# Patient Record
Sex: MALE | Race: WHITE
[De-identification: names, ages, dates, MRNs, and addresses within clinical notes are randomized per-mention and may not be internally consistent; named-entity substitution may affect disease eponyms.]

---

## 2017-07-07 NOTE — ECHO
DATE OF PROCEDURE: 07/07/2017

 

AGE: 47

GENDER: Male

HEIGHT: 72 inches.

WEIGHT: 155 pounds.

BODY SURFACE AREA: 1.91 sq m.

 

OUTPATIENT:

 

REFERRING PHYSICIAN: Won Mcgee

 

INDICATION: Disorder of circulatory system.

 

MEASUREMENTS:

 

2D MEASUREMENTS:

RV - 4.2 cm

LV- 4.8 cm

Septum - 1.0 cm

Posterior wall - 1.0 cm

Aortic root - 3.9 cm

LA - 4.2 cm

LVEF - 65%

 

DOPPLER MEASUREMENTS:

AV - 1.0 m/s

LVOT - 0.8 m/s

LVOT diameter - 2.4 cm

MV-E: 53  A: 47  EA ratio 1.1

Early mitral deceleration time 225 ms

E-prime - 7

A-prime - 12

E/E prime ratio 7.6

PV - 0.8 m/s

Pulmonary artery acceleration time 116 ms

RVSP- 32 mmHg

IVC - 1.6 cm

 

COMMENTS: Normal sinus rhythm without intraventricular conduction disturbance.

 

Borderline left atrial enlargement but normal left ventricular size.  Borderline

right heart chamber enlargement.  Left ventricle (LV) wall thickness was normal.

On real-time imaging from the parasternal and apical projections wall motion was

symmetrical and normal to hyperkinetic. Normal-appearing mitral valvular

apparatus and leaflet excursion with no posterior systolic buckling.  Three equal

size aortic cusps of normal thickness and cusp separation.  Aortic root size

appeared to be at least borderline increased.  His ascending aorta and aortic

arch and proximal descending aorta did not appear to be dilated.  No apparent

intracardiac mass.  Minuscule posterior echo-free space.

 

Color flow Doppler study taken from the parasternal and apical projection showed

very mild tricuspid but no mitral or aortic insufficiency.

 

Guided continuous wave Doppler of his aortic valve showed a normal peak systolic

velocity against LV outflow tract obstruction.

 

Pulsed and continuous wave Doppler of his LV inflow tract taken from the apical

four-chamber projection showed normal diastolic filling velocities against mitral

stenosis.  The filling pattern was also normal.  However, his early mitral

deceleration time was somewhat prolonged and tissue Doppler of his mitral annulus

also suggested a degree of LV diastolic dysfunction but current estimated mean

left atrial pressure was normal.

 

Pulsed continuous wave Doppler of his pulmonary trunk showed a normal peak

systolic velocity against right ventricle (RV) outflow tract obstruction.  His

pulmonary artery acceleration time was normal against an elevated pulmonary

vascular resistance.

 

Guided continuous wave Doppler of his tricuspid valve allowed our estimation of

his right ventricular systolic pressure (upper limits of normal to borderline

increased).  Normal IVC size and collapse against an elevated central venous

pressure.

 

CONCLUSION:

Normal left ventricular size, wall thickness and wall motion.

 

Borderline left atrial enlargement with Doppler evidence of a degree of impaired

LV diastolic function but currently normal estimated mean left atrial pressure.

 

Mildly dilated right heart chambers with normal contraction and only mild current

pulmonary hypertension.

 

Normal IVC size and collapse against an elevated central venous pressure.

 

Borderline dilated aortic root with normal appearing aortic valve and function.

## 2017-11-13 NOTE — REP
PARTIAL LUMBAR SPINE, THREE VIEWS:

 

HISTORY:  Degenerative disc disease.

 

COMPARISON:  01/10/2013

 

There is no acute fracture or subluxation.  The L1-2 and L3-4 through L5-S1

intervertebral discs are decreased in height.  Vacuum phenomenon is present at

the L4-5 and L5-S1 levels.  These findings are consistent with disc degeneration.

Osteophytes are present on L2-5.

 

IMPRESSION:

 

Degenerative change as described above.

 

 

Signed by

Marcial De La Garza MD 11/13/2017 03:55 P

## 2017-11-13 NOTE — REP
RIGHT KNEE, FIVE VIEWS:

 

HISTORY:  Degenerative joint disease.

 

There is no acute fracture or dislocation.  The joint spaces are normal in

appearance.  An ossified density is present in the distal femur likely

representing a bone infarction.

 

IMPRESSION:

 

There is no acute fracture or dislocation.

 

 

Signed by

Marcial De La Garza MD 11/13/2017 03:55 P

## 2019-07-19 NOTE — REP
Lumbar spine seven views:

Comparison is 01/10/2013.

The current study includes flexion and extension views in the lateral

projection.

There is mild scoliosis convex left, as an interval change, possibly positional.

Vertebral body heights and alignment are normal and unchanged.

There is degenerative disc disease at L4-5 and L5 S1, unchanged.

There is no spondylolysis or spondylolisthesis.

There is no listhesis on the lateral views in flexion or extension.

The pedicles, facet articulations and sacroiliac articulations are unremarkable.

Impression:

L4-5 and L5 S1 degenerative disc disease.  This is unchanged.

Mild scoliosis convex left.

Otherwise, negative lumbar spine.

 

 

 

Electronically Signed by

Giuseppe Duarte MD 07/19/2019 10:34 A

## 2020-06-05 ENCOUNTER — HOSPITAL ENCOUNTER (EMERGENCY)
Dept: HOSPITAL 53 - M ED | Age: 50
Discharge: LEFT BEFORE DISCHARGE | End: 2020-06-05
Payer: COMMERCIAL

## 2020-06-05 VITALS — WEIGHT: 150.31 LBS | BODY MASS INDEX: 21.04 KG/M2 | HEIGHT: 71 IN

## 2020-06-05 VITALS — SYSTOLIC BLOOD PRESSURE: 140 MMHG | DIASTOLIC BLOOD PRESSURE: 88 MMHG

## 2020-06-05 DIAGNOSIS — M54.12: Primary | ICD-10-CM

## 2020-06-05 DIAGNOSIS — Z79.02: ICD-10-CM

## 2020-06-05 DIAGNOSIS — F17.200: ICD-10-CM

## 2020-06-05 DIAGNOSIS — J45.909: ICD-10-CM

## 2020-06-05 DIAGNOSIS — Z79.899: ICD-10-CM

## 2020-06-05 LAB
HCT VFR BLD AUTO: 44.7 % (ref 42–52)
HGB BLD-MCNC: 15.2 G/DL (ref 13.5–17.5)
MCH RBC QN AUTO: 33.3 PG (ref 27–33)
MCHC RBC AUTO-ENTMCNC: 34 G/DL (ref 32–36.5)
MCV RBC AUTO: 97.8 FL (ref 80–96)
PLATELET # BLD AUTO: 260 10^3/UL (ref 150–450)
RBC # BLD AUTO: 4.57 10^6/UL (ref 4.3–6.1)
WBC # BLD AUTO: 7.5 10^3/UL (ref 4–10)

## 2020-06-12 ENCOUNTER — HOSPITAL ENCOUNTER (OUTPATIENT)
Dept: HOSPITAL 53 - M PLAIMG | Age: 50
End: 2020-06-12
Payer: COMMERCIAL

## 2020-06-12 DIAGNOSIS — M50.323: ICD-10-CM

## 2020-06-12 DIAGNOSIS — M50.321: ICD-10-CM

## 2020-06-12 DIAGNOSIS — M50.31: ICD-10-CM

## 2020-06-12 DIAGNOSIS — M54.12: Primary | ICD-10-CM

## 2020-06-12 DIAGNOSIS — M50.322: ICD-10-CM

## 2020-06-12 NOTE — REPPI
CERVICAL SPINE SERIES:

 

Full cervical spine series performed including flexion, extension and oblique

views.

 

There is no compression fracture.  There is fusion of C2 and C3 vertebral bodies.

In the neutral position, there is mild retrolisthesis of C3 on C4 approximately 3

mm.  There is also retrolisthesis of C5 on C6 about 4 mm.   With flexion, the

alignment is not changed although there is reduction of the retrolisthesis of C5

on C6 to about 3 mm.  With extension, the retrolisthesis of C3 on C4 increases to

5 mm, there is mild retrolisthesis of C4 on C5 approximately 2 mm, and there is

retrolisthesis of C5 on C6 4 mm.  There is mild diffuse spurring.  There is

moderate disc space narrowing and subchondral sclerosis at C3-4.  There is

moderate narrowing and subchondral sclerosis at C5-6 and C6-7.  There is diffuse

sclerosis and spurring at the posterior facet joints.  Oblique views show

possible mild foraminal narrowing at C5-6 on the left and C6-7 on the right.

 

IMPRESSION:

 

Degenerative changes as discussed in detail above.

 

 

Electronically Signed by

Giuseppe Treviño MD 06/16/2020 06:23 P

## 2020-06-18 ENCOUNTER — HOSPITAL ENCOUNTER (EMERGENCY)
Dept: HOSPITAL 53 - M ED | Age: 50
Discharge: HOME | End: 2020-06-18
Payer: COMMERCIAL

## 2020-06-18 VITALS — SYSTOLIC BLOOD PRESSURE: 121 MMHG | DIASTOLIC BLOOD PRESSURE: 69 MMHG

## 2020-06-18 VITALS — BODY MASS INDEX: 21.48 KG/M2 | WEIGHT: 153.44 LBS | HEIGHT: 71 IN

## 2020-06-18 DIAGNOSIS — M54.12: Primary | ICD-10-CM

## 2020-06-18 DIAGNOSIS — Z79.51: ICD-10-CM

## 2020-06-18 DIAGNOSIS — Z79.899: ICD-10-CM

## 2020-06-18 DIAGNOSIS — F17.200: ICD-10-CM

## 2020-06-18 NOTE — REP
SHOULDER:

 

REASON FOR EXAM:  Pain and tingling. No history of trauma.

 

No priors.

 

FINDINGS:  Three views of the shoulder were performed.  The acromioclavicular and

glenohumeral relationships are within normal limits.  There is no acute fracture

or destructive osseous lesion.  There is a small cyst in the humeral head seen

incidentally and unchanged from the imaged portion of the right shoulder obtained

during a chest radiograph of 09/05/2012. It was also seen on chest CT of

07/05/2017.

 

 

Electronically Signed by

Sammy Garcia DO 06/18/2020 06:46 P

## 2020-06-22 ENCOUNTER — HOSPITAL ENCOUNTER (EMERGENCY)
Dept: HOSPITAL 53 - M ED | Age: 50
Discharge: HOME | End: 2020-06-22
Payer: COMMERCIAL

## 2020-06-22 VITALS — DIASTOLIC BLOOD PRESSURE: 88 MMHG | SYSTOLIC BLOOD PRESSURE: 134 MMHG

## 2020-06-22 VITALS — WEIGHT: 150.31 LBS | BODY MASS INDEX: 21.04 KG/M2 | HEIGHT: 71 IN
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
## 2020-06-22 DIAGNOSIS — F41.9: ICD-10-CM

## 2020-06-22 DIAGNOSIS — S22.32XA: Primary | ICD-10-CM
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
## 2020-06-22 DIAGNOSIS — F17.200: ICD-10-CM
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
## 2020-06-22 DIAGNOSIS — Z79.899: ICD-10-CM

## 2020-06-22 DIAGNOSIS — Z79.51: ICD-10-CM

## 2020-06-22 LAB
BASOPHILS # BLD AUTO: 0.1 10^3/UL (ref 0–0.2)
BASOPHILS NFR BLD AUTO: 1 % (ref 0–1)
EOSINOPHIL # BLD AUTO: 0.2 10^3/UL (ref 0–0.5)
EOSINOPHIL NFR BLD AUTO: 2.5 % (ref 0–3)
HCT VFR BLD AUTO: 41.7 % (ref 42–52)
HGB BLD-MCNC: 14.4 G/DL (ref 13.5–17.5)
LYMPHOCYTES # BLD AUTO: 1.5 10^3/UL (ref 1.5–5)
LYMPHOCYTES NFR BLD AUTO: 20.4 % (ref 24–44)
MCH RBC QN AUTO: 33.7 PG (ref 27–33)
MCHC RBC AUTO-ENTMCNC: 34.5 G/DL (ref 32–36.5)
MCV RBC AUTO: 97.7 FL (ref 80–96)
MONOCYTES # BLD AUTO: 0.6 10^3/UL (ref 0–0.8)
MONOCYTES NFR BLD AUTO: 8.8 % (ref 0–5)
NEUTROPHILS # BLD AUTO: 4.9 10^3/UL (ref 1.5–8.5)
NEUTROPHILS NFR BLD AUTO: 67 % (ref 36–66)
PLATELET # BLD AUTO: 226 10^3/UL (ref 150–450)
RBC # BLD AUTO: 4.27 10^6/UL (ref 4.3–6.1)
WBC # BLD AUTO: 7.3 10^3/UL (ref 4–10)

## 2020-06-22 PROCEDURE — 71260 CT THORAX DX C+: CPT

## 2020-06-22 PROCEDURE — 99284 EMERGENCY DEPT VISIT MOD MDM: CPT

## 2020-06-22 PROCEDURE — 74177 CT ABD & PELVIS W/CONTRAST: CPT

## 2020-06-22 PROCEDURE — 85025 COMPLETE CBC W/AUTO DIFF WBC: CPT

## 2020-06-22 PROCEDURE — 70450 CT HEAD/BRAIN W/O DYE: CPT

## 2020-06-22 PROCEDURE — 80047 BASIC METABLC PNL IONIZED CA: CPT

## 2020-06-22 PROCEDURE — 81001 URINALYSIS AUTO W/SCOPE: CPT

## 2020-06-22 NOTE — REP
REASON:  Trauma.

 

PRIORS:  None.

 

CONTRAST:  100 mL Isovue 370.

 

For the description of the lung bases, please see the CT chest report.

 

The liver, gallbladder, spleen, pancreas, adrenal glands, and kidneys are within

normal limits.  The abdominal aort and para-aortic regions are within normal

limits.  No free fluid or free air is seen in the abdomen or pelvis.  The

intra-abdominal and intrapelvic bowel loops and their mesenteries are within

normal limits.  There is no evidence of intra-abdominal or intrapelvic mass or

adenopathy.

 

Bone window technique through the examination shows a fracture involving the left

10th rib posteriorly.

 

IMPRESSION:

 

1.  Left 10th rib fracture.

 

2.  No evidence of acute intra-abdominal or intrapelvic disease.

 

 

Electronically Signed by

Sammy Garcia DO 06/22/2020 04:39 P

## 2020-06-22 NOTE — REP
CT BRAIN WITHOUT IV CONTRAST:

 

CT brain performed without IV contrast.

 

There is mild atrophy.  There is no midline shift or mass effect.  Incidental

note is made of a cavum septum pellucidum and cavum vergae.  There is no acute

intracranial hemorrhage. There is no extra-axial fluid collection.  There is no

evidence of a skull fracture.

 

IMPRESSION:

 

No evidence of acute intracranial hemorrhage or skull fracture.

 

 

Electronically Signed by

Giuseppe Treviño MD 06/22/2020 07:43 P

## 2020-06-22 NOTE — REP
REASON: Trauma.

 

CONTRAST: 100 mL Isovue 370.

 

Prior examination 07/25/2016.

 

There is no mediastinal or hilar adenopathy.  There are no pleural or pericardial

effusions.

 

Bone window technique through the examination shows a fracture of the left 10th

rib posteriorly.

 

Evaluation of the lung fields shows a single lingular curvilinear density.

 

IMPRESSION:

 

Likely lingular subsegmental atelectatic change versus fibrosis.  It has

developed since the last CT of the chest.  There are no other lung field

abnormalities.  There is a fracture of the left 10th rib.

 

 

Electronically Signed by

Sammy Garcia DO 06/22/2020 04:39 P

## 2020-08-27 ENCOUNTER — HOSPITAL ENCOUNTER (OUTPATIENT)
Dept: HOSPITAL 53 - M LABSMTC | Age: 50
End: 2020-08-27
Attending: ORTHOPAEDIC SURGERY
Payer: COMMERCIAL

## 2020-08-27 DIAGNOSIS — Z20.828: Primary | ICD-10-CM

## 2020-10-26 ENCOUNTER — HOSPITAL ENCOUNTER (OUTPATIENT)
Dept: HOSPITAL 53 - M PLAIMG | Age: 50
End: 2020-10-26
Payer: COMMERCIAL

## 2020-10-26 ENCOUNTER — HOSPITAL ENCOUNTER (OUTPATIENT)
Dept: HOSPITAL 53 - M SFHCPLAZ | Age: 50
End: 2020-10-26
Attending: INTERNAL MEDICINE
Payer: COMMERCIAL

## 2020-10-26 DIAGNOSIS — G57.93: Primary | ICD-10-CM

## 2020-10-26 DIAGNOSIS — M51.36: Primary | ICD-10-CM

## 2020-10-26 LAB
ALBUMIN SERPL BCG-MCNC: 4.4 GM/DL (ref 3.2–5.2)
ALT SERPL W P-5'-P-CCNC: 79 U/L (ref 12–78)
BILIRUB SERPL-MCNC: 0.5 MG/DL (ref 0.2–1)
BUN SERPL-MCNC: 9 MG/DL (ref 7–18)
CALCIUM SERPL-MCNC: 9.4 MG/DL (ref 8.5–10.1)
CHLORIDE SERPL-SCNC: 104 MEQ/L (ref 98–107)
CO2 SERPL-SCNC: 25 MEQ/L (ref 21–32)
CREAT SERPL-MCNC: 0.78 MG/DL (ref 0.7–1.3)
FOLATE SERPL-MCNC: 4.2 NG/ML
GFR SERPL CREATININE-BSD FRML MDRD: > 60 ML/MIN/{1.73_M2} (ref 56–?)
GLUCOSE SERPL-MCNC: 105 MG/DL (ref 70–100)
MAGNESIUM SERPL-MCNC: 2.1 MG/DL (ref 1.8–2.4)
POTASSIUM SERPL-SCNC: 4.1 MEQ/L (ref 3.5–5.1)
PROT SERPL-MCNC: 7.1 GM/DL (ref 6.4–8.2)
SODIUM SERPL-SCNC: 139 MEQ/L (ref 136–145)
VIT B12 SERPL-MCNC: 490 PG/ML

## 2020-10-26 NOTE — REPPI
INDICATION:

NEUROPATHIC PAIN.



COMPARISON:

July 19, 2019..



TECHNIQUE:

Five routine views of the lumbar spine.



FINDINGS:

There is a minimal levoconvex curve in the lumbar spine unchanged.  Lumbar vertebral

body heights are preserved.  There is slight straightening on the lateral radiograph

also unchanged.  There is degenerative narrowing with discogenic spurring anteriorly

at L4-5 and L3-4 and to a lesser extent at L2-3 L1-2 and L5-S1.  These degenerative

disc changes are stable allowing for slight differences in technique when compared

with the prior study.  Discogenic spurring is also noted at T11-12 unchanged.  There

is no evidence of spondylolysis or spondylolisthesis.  Pedicles and posterior elements

appear intact.  Psoas margins are symmetric.  Sacrum and SI joints are unremarkable.



IMPRESSION:

Diffuse degenerative disc disease most pronounced at L4-5 and L3-4 unchanged from

comparison study.  No acute abnormality.





<Electronically signed by Ho Childers > 10/26/20 [id number]

## 2021-01-08 ENCOUNTER — HOSPITAL ENCOUNTER (INPATIENT)
Dept: HOSPITAL 53 - M ED | Age: 51
LOS: 1 days | Discharge: HOME | DRG: 244 | End: 2021-01-09
Attending: INTERNAL MEDICINE | Admitting: INTERNAL MEDICINE
Payer: COMMERCIAL

## 2021-01-08 VITALS — WEIGHT: 143.52 LBS | BODY MASS INDEX: 20.09 KG/M2 | HEIGHT: 71 IN

## 2021-01-08 VITALS — SYSTOLIC BLOOD PRESSURE: 126 MMHG | DIASTOLIC BLOOD PRESSURE: 85 MMHG

## 2021-01-08 DIAGNOSIS — Z79.899: ICD-10-CM

## 2021-01-08 DIAGNOSIS — F17.200: ICD-10-CM

## 2021-01-08 DIAGNOSIS — K57.80: Primary | ICD-10-CM

## 2021-01-08 DIAGNOSIS — J44.9: ICD-10-CM

## 2021-01-08 LAB
ALBUMIN SERPL BCG-MCNC: 3.7 GM/DL (ref 3.2–5.2)
ALT SERPL W P-5'-P-CCNC: 76 U/L (ref 12–78)
BASOPHILS # BLD AUTO: 0.1 10^3/UL (ref 0–0.2)
BASOPHILS NFR BLD AUTO: 0.6 % (ref 0–1)
BILIRUB CONJ SERPL-MCNC: 0.1 MG/DL (ref 0–0.2)
BILIRUB SERPL-MCNC: 0.5 MG/DL (ref 0.2–1)
BUN SERPL-MCNC: 7 MG/DL (ref 7–18)
CALCIUM SERPL-MCNC: 9.5 MG/DL (ref 8.5–10.1)
CHLORIDE SERPL-SCNC: 97 MEQ/L (ref 98–107)
CO2 SERPL-SCNC: 27 MEQ/L (ref 21–32)
CREAT SERPL-MCNC: 0.77 MG/DL (ref 0.7–1.3)
EOSINOPHIL # BLD AUTO: 0 10^3/UL (ref 0–0.5)
EOSINOPHIL NFR BLD AUTO: 0.3 % (ref 0–3)
GFR SERPL CREATININE-BSD FRML MDRD: > 60 ML/MIN/{1.73_M2} (ref 56–?)
GLUCOSE SERPL-MCNC: 118 MG/DL (ref 70–100)
HCT VFR BLD AUTO: 39.2 % (ref 42–52)
HGB BLD-MCNC: 13.1 G/DL (ref 13.5–17.5)
LIPASE SERPL-CCNC: 104 U/L (ref 73–393)
LYMPHOCYTES # BLD AUTO: 0.9 10^3/UL (ref 1.5–5)
LYMPHOCYTES NFR BLD AUTO: 8.8 % (ref 24–44)
MCH RBC QN AUTO: 32.2 PG (ref 27–33)
MCHC RBC AUTO-ENTMCNC: 33.4 G/DL (ref 32–36.5)
MCV RBC AUTO: 96.3 FL (ref 80–96)
MONOCYTES # BLD AUTO: 1.7 10^3/UL (ref 0–0.8)
MONOCYTES NFR BLD AUTO: 16.1 % (ref 0–5)
NEUTROPHILS # BLD AUTO: 7.9 10^3/UL (ref 1.5–8.5)
NEUTROPHILS NFR BLD AUTO: 73.5 % (ref 36–66)
PLATELET # BLD AUTO: 365 10^3/UL (ref 150–450)
POTASSIUM SERPL-SCNC: 3.5 MEQ/L (ref 3.5–5.1)
PROT SERPL-MCNC: 6.9 GM/DL (ref 6.4–8.2)
RBC # BLD AUTO: 4.07 10^6/UL (ref 4.3–6.1)
SODIUM SERPL-SCNC: 133 MEQ/L (ref 136–145)
WBC # BLD AUTO: 10.7 10^3/UL (ref 4–10)

## 2021-01-08 PROCEDURE — 0W9J30Z DRAINAGE OF PELVIC CAVITY WITH DRAINAGE DEVICE, PERCUTANEOUS APPROACH: ICD-10-PCS

## 2021-01-08 RX ADMIN — SODIUM CHLORIDE SCH MLS/HR: 9 INJECTION, SOLUTION INTRAVENOUS at 18:15

## 2021-01-08 RX ADMIN — ACETAMINOPHEN PRN MG: 500 TABLET ORAL at 19:11

## 2021-01-08 RX ADMIN — GABAPENTIN SCH MG: 300 CAPSULE ORAL at 21:09

## 2021-01-08 NOTE — REP
INDICATION:

diverticular abscess.



COMPARISON:

None.



TECHNIQUE:

The procedure was performed under the direct supervision of Dr. Treviño.



The patient has a history of a 6.2 x 6.1 x 4.0 cm extraluminal collection in the mid

pelvis seen on a previous CT scan performed earlier today.



The risks and benefits of the procedure were explained to the patient and informed

consent was obtained.



The abscess was localized using ultrasound guidance.  The skin was prepped and draped

in a sterile fashion.  1% lidocaine was used as a local anesthetic.  Using CT guidance

an 8 [place] Sk[place] APDL catheter was inserted using trocar technique.  40 cc of beige,

proteinaceous fluid was withdrawn and sent to the lab for analysis.  The abscess

cavity was flushed with 4 10 cc aliquots of sterile saline.  The

Catheter was affixed to the skin and a sterile dressing was applied.  The catheter was

connected to a gravity drainage bag.





The patient tolerated the procedure well and there were no immediate complications.



FINDINGS:

None



IMPRESSION:

CT-guided pelvic abscess drain yielding 40 cc of beige, proteinaceous fluid.



<Electronically signed by Pranav Zuñiga > 01/08/21 [id number]

<Electronically signed by Giuseppe Treviño > 01/08/21 [id number]

## 2021-01-08 NOTE — HPEPDOC
[facility] Medical History & Physical


Date of Admission


Jan 8, 2021


Date of Service:  Jan 8, 2021


Attending Physician:  GONDAL,KHUBAIB N. MD





History and Physical


CHIEF COMPLAINT: Abdominal pain





HISTORY OF PRESENT ILLNESS: 50 y.o male w/ PHM of COPD & ?Raynaud's presents 

with abdominal pain over the past 4 days. Pain was ann-umbilical and radiating 

in the lower abdomen. He denies any associated symptoms; denies N/V/D or fever. 

CT in the ED shows diverticular abscess, s/p IR drain placement. He was seen 

post-procedure and reports improvement in symptoms already and he wishes to go 

home. 





10 point ROS is negative except for above.





PAST MEDICAL HISTORY:


1. COPD





PAST SURGICAL HISTORY:


1. None





SOCIAL HISTORY:


Current smoker, 1 pack per day.


Social alcohol use.


Denies drug use





FAMILY HISTORY:


Negative for malignancy or heart disease





ALLERGIES: Please see below.





HOME MEDICATIONS: Please see below. 





PHYSICAL EXAMINATION:


VITAL SIGNS: Please see below.


GENERAL: No distress


HEENT: Normocephalic, atraumatic, moist mucous membranes


NECK: Supple


CARDIOVASCULAR EXAMINATION: S1, S2, no murmurs


RESPIRATORY EXAMINATION: Clear to auscultation, no wheezing


ABDOMINAL EXAMINATION: Soft, mild tenderness, abdominal drain with 

serosanguineous drainage


EXTREMITIES: Range of motion intact


SKIN: No rash


NEUROLOGICAL EXAMINATION: Alert and oriented 3, no focal deficits 


PSYCHIATRIC EXAMINATION: Calm and cooperative





LABORATORY DATA: See below.





IMAGING: CT abdomen and pelvis showing diverticular abscess adjacent to the 

sigmoid colon.





MICROBIOLOGY: Please see below. 





ASSESSMENT: 50-year-old male with past medical history of COPD is admitted for 

diverticular abscess.





PLAN:


1. Diverticular abscess.


 Status post drain placement by IR today, cultures pending.


 Cipro/Flagyl, nothing by mouth, IV fluids.


 Gen. surgery eval pending





2. COPD


 DuoNeb's when necessary


 Active smoker, nicotine patch.





DVT prophylaxis: Lovenox.


GI prophylaxis: Not needed





Vital Signs





Vital Signs








  Date Time  Temp Pulse Resp B/P (MAP) Pulse Ox O2 Delivery O2 Flow Rate FiO2


 


1/8/21 15:18   18     


 


1/8/21 14:23 99.1 98  144/95 (111) 99 Room Air  











Laboratory Data


Labs 24H


Laboratory Tests 2


1/8/21 12:00: 


Immature Granulocyte % (Auto) 0.7, Neutrophils (%) (Auto) 73.5H, Lymphocytes (%)

(Auto) 8.8L, Monocytes (%) (Auto) 16.1H, Eosinophils (%) (Auto) 0.3, Basophils 

(%) (Auto) 0.6, Neutrophils # (Auto) 7.9, Lymphocytes # (Auto) 0.9L, Monocytes #

(Auto) 1.7H, Eosinophils # (Auto) 0.0, Basophils # (Auto) 0.1, Nucleated Red 

Blood Cells % (auto) 0.0, Anion Gap 9, Glomerular Filtration Rate > 60.0, 

Calcium Level 9.5, Total Bilirubin 0.5, Direct Bilirubin 0.1, Aspartate Amino 

Transf (AST/SGOT) 35, Alanine Aminotransferase (ALT/SGPT) 76, Alkaline 

Phosphatase 102, Total Protein 6.9, Albumin 3.7, Albumin/Globulin Ratio 1.2, 

Lipase 104


1/8/21 14:00: 


Urine Color YELLOW, Urine Appearance CLEAR, Urine pH 5.0, Urine Specific Gravity

>1.060H, Urine Protein NEGATIVE, Urine Glucose (UA) 1+H, Urine Ketones NEGATIVE,

Urine Blood NEGATIVE, Urine Nitrite NEGATIVE, Urine Bilirubin NEGATIVE, Urine 

Urobilinogen 0.2, Urine Leukocyte Esterase NEGATIVE, Urine WBC (Auto) 1, Urine 

RBC (Auto) 1, Urine Hyaline Casts (Auto) 0, Urine Bacteria (Auto) NEGATIVE, 

Urine Squamous Epithelial Cells 0, Urine Sperm (Auto) 


1/8/21 15:08: Coronavirus (COVID-19)(PCR) NEGATIVE


CBC/BMP


Laboratory Tests


1/8/21 12:00








Microbiology





Microbiology


1/8/21 Anaerobic Culture, Received


         Pending


1/8/21 Gram Stain, Received


         Pending


1/8/21 Abscess Culture, Received


         Pending





Home Medications


Scheduled


Clopidogrel Bisulfate (Clopidogrel) 75 Mg Tab, 75 MG PO DAILY


Gabapentin (Gabapentin) 300 Mg Capsule, 300 MG PO BID





Scheduled PRN


Albuterol Sulfate (Ventolin Hfa) 108 Mcg/Act Aer, 2 PUFFS INH Q6H PRN for 

SOB/WHEEZING





Allergies


Coded Allergies:  


     No Known Allergies (Unverified , 3/15/19)





A-FIB/CHADSVASC


A-FIB History


Current/History of A-Fib/PAF?:  No











GONDAL,KHUBAIB N. MD            Jan 8, 2021 16:57

## 2021-01-08 NOTE — REP
INDICATION:

? constipation



COMPARISON:

None.



TECHNIQUE:

Supine view of the abdomen and pelvis.



FINDINGS:

Bowel gas pattern is nonspecific and without obstruction or perforation.  No

significant fecal stasis.  No organomegaly.  No abnormal calcifications.  Incidental

phleboliths noted in the pelvis.  Skeletal structures intact.



IMPRESSION:

Normal abdominal radiograph.





<Electronically signed by Tad Argueta > 01/08/21 [id number]

## 2021-01-08 NOTE — REP
INDICATION:

mod rlq pain into LLQ, concern for appy or diverticulitis.



COMPARISON:

None



TECHNIQUE:

Axial contrast-enhanced images from the lung bases to the pubic symphysis using 100 cc

Isovue 370 intravenous contrast material.  .



This CT examination was performed using the following dose reduction techniques:

Automated exposure control, adjustment of mA and/or kv according to the patient's

size, and the use of iterative reconstruction technique.



FINDINGS:

There is a 6.2 x 6.1 x 4.0 cm extraluminal collection in the mid pelvis with

surrounding inflammatory stranding and air-fluid level as well as small adjacent foci

of gas (series 201 images ).  The collection is adjacent to the mid sigmoid

colon which demonstrates multiple diverticula and mild mucosal thickening and this

likely represents diverticular abscess with contained perforation.



There is no evidence for bowel obstruction and no further significant pneumoperitoneum

to suggest sybil bowel perforation.  Normal terminal ileum, cecum and appendix are

identified in the right lower quadrant.



Liver, spleen, pancreas, gallbladder, bilateral adrenal glands and kidneys are normal.

Further evaluation of the pelvis demonstrates collapsed relatively normal bladder and

mildly prominent prostate/seminal vesicles.



No significant free fluid/ascites.  No significant intraperitoneal or retroperitoneal

adenopathy.  Abdominal aorta and vasculature without aneurysm or dissection.

Musculoskeletal structures demonstrate age-related changes.





IMPRESSION:

1.   Suspected diverticular abscess adjacent to the sigmoid colon with air-fluid level

and smaller foci of peripheral gas.  No associated bowel obstruction or further

pneumoperitoneum to suggest sybil perforation.





<Electronically signed by Tad Argueta > 01/08/21 [id number]

## 2021-01-09 VITALS — DIASTOLIC BLOOD PRESSURE: 85 MMHG | SYSTOLIC BLOOD PRESSURE: 129 MMHG

## 2021-01-09 LAB
ALBUMIN SERPL BCG-MCNC: 2.7 GM/DL (ref 3.2–5.2)
ALT SERPL W P-5'-P-CCNC: 48 U/L (ref 12–78)
BILIRUB SERPL-MCNC: 0.5 MG/DL (ref 0.2–1)
BUN SERPL-MCNC: 6 MG/DL (ref 7–18)
CALCIUM SERPL-MCNC: 8 MG/DL (ref 8.5–10.1)
CHLORIDE SERPL-SCNC: 103 MEQ/L (ref 98–107)
CO2 SERPL-SCNC: 28 MEQ/L (ref 21–32)
CREAT SERPL-MCNC: 0.68 MG/DL (ref 0.7–1.3)
GFR SERPL CREATININE-BSD FRML MDRD: > 60 ML/MIN/{1.73_M2} (ref 56–?)
GLUCOSE SERPL-MCNC: 97 MG/DL (ref 70–100)
HCT VFR BLD AUTO: 34.4 % (ref 42–52)
HGB BLD-MCNC: 11 G/DL (ref 13.5–17.5)
MAGNESIUM SERPL-MCNC: 1.9 MG/DL (ref 1.8–2.4)
MCH RBC QN AUTO: 31.4 PG (ref 27–33)
MCHC RBC AUTO-ENTMCNC: 32 G/DL (ref 32–36.5)
MCV RBC AUTO: 98.3 FL (ref 80–96)
PLATELET # BLD AUTO: 327 10^3/UL (ref 150–450)
POTASSIUM SERPL-SCNC: 3.5 MEQ/L (ref 3.5–5.1)
PROT SERPL-MCNC: 5.4 GM/DL (ref 6.4–8.2)
RBC # BLD AUTO: 3.5 10^6/UL (ref 4.3–6.1)
SODIUM SERPL-SCNC: 137 MEQ/L (ref 136–145)
WBC # BLD AUTO: 5.5 10^3/UL (ref 4–10)

## 2021-01-09 RX ADMIN — GABAPENTIN SCH MG: 300 CAPSULE ORAL at 09:13

## 2021-01-09 RX ADMIN — SODIUM CHLORIDE SCH MLS/HR: 9 INJECTION, SOLUTION INTRAVENOUS at 03:48

## 2021-01-09 RX ADMIN — METRONIDAZOLE SCH MLS/HR: 500 INJECTION, SOLUTION INTRAVENOUS at 12:43

## 2021-01-09 RX ADMIN — SODIUM CHLORIDE SCH MLS/HR: 9 INJECTION, SOLUTION INTRAVENOUS at 08:48

## 2021-01-09 RX ADMIN — METRONIDAZOLE SCH MLS/HR: 500 INJECTION, SOLUTION INTRAVENOUS at 03:47

## 2021-01-09 RX ADMIN — ACETAMINOPHEN PRN MG: 500 TABLET ORAL at 09:15

## 2021-01-09 NOTE — DS.PDOC
Discharge Summary


General


Date of Admission


Jan 8, 2021 at 16:35


Date of Discharge


1/9/2021


Attending Physician:  GONDAL,KHUBAIB N. MD





Discharge Summary


PROCEDURES PERFORMED DURING STAY: None.





ADMITTING DIAGNOSES: 


1. Diverticular abscess.





DISCHARGE DIAGNOSES:


1. Diverticular abscess.





COMPLICATIONS/CHIEF COMPLAINT: Abdominal Pain.





HISTORY OF PRESENT ILLNESS: 50-year-old male was admitted for diverticular 

abscess. He underwent IR drain placement with significant improvement in s

ymptoms. He has been tolerating his diet since drain placement. He is seen in 

the morning, comfortable, without complaints, wishing to go home. He was invited

by general surgery who are okay with discharge and outpatient follow-up in 1 

week for drain removal and further management. Patient will be discharged on 

oral antibiotics. Patient is agreeable with discharge plan.





HOSPITAL COURSE: As above. 





DISCHARGE MEDICATIONS: Please see below.


 


ALLERGIES: Please see below.





PHYSICAL EXAMINATION:


VITAL SIGNS: Please see below.


GENERAL: No distress


HEENT: Normocephalic, atraumatic, moist mucous membranes


NECK: Supple


CARDIOVASCULAR EXAMINATION: S1, S2, no murmurs


RESPIRATORY EXAMINATION: Clear to auscultation, no wheezing


ABDOMINAL EXAMINATION: Soft, mild tenderness, abdominal drain with minimal 

output, positive bowel sounds


EXTREMITIES: Range of motion intact


SKIN: No rash


NEUROLOGICAL EXAMINATION: Alert and oriented 3, no focal deficits 


PSYCHIATRIC EXAMINATION: Calm and cooperative





LABORATORY DATA: Please see below.





IMAGING: CT abdomen and pelvis consistent with diverticular abscess





PROGNOSIS: Fair





ACTIVITY: As tolerated.





DIET: Regular





DISCHARGE PLAN: Follow-up with general surgery and PCP in 1-2 weeks





DISPOSITION: Home





DISCHARGE INSTRUCTIONS:


1. As above.





DISCHARGE CONDITION: Stable.





TIME SPENT ON DISCHARGE: Greater than 20 minutes.





Vital Signs/I&Os





Vital Signs








  Date Time  Temp Pulse Resp B/P (MAP) Pulse Ox O2 Delivery O2 Flow Rate FiO2


 


1/9/21 06:00 98.8 84 16 129/85 (100) 99 Room Air  














I&O- Last 24 Hours up to 6 AM 


 


 1/9/21





 06:00


 


Intake Total 2725 ml


 


Output Total 30 ml


 


Balance 2695 ml











Laboratory Data


Labs 24H


Laboratory Tests 2


1/9/21 07:23: 


Nucleated Red Blood Cells % (auto) 0.0, Anion Gap 6L, Glomerular Filtration Rate

> 60.0, Calcium Level 8.0#L, Magnesium Level 1.9, Total Bilirubin 0.5, Aspartate

Amino Transf (AST/SGOT) 18, Alanine Aminotransferase (ALT/SGPT) 48, Alkaline 

Phosphatase 79, Total Protein 5.4#L, Albumin 2.7#L, Albumin/Globulin Ratio 1.0


CBC/BMP


Laboratory Tests


1/9/21 07:23











Microbiology





Microbiology


1/8/21 Anaerobic Culture, Received


         Pending


1/8/21 Gram Stain - Final, Resulted


         


1/8/21 Abscess Culture, Resulted


         Pending





Discharge Medications


Scheduled


Ciprofloxacin HCl (Ciprofloxacin HCl) 500 Mg Tablet, 1 TAB PO BID


Clopidogrel Bisulfate (Clopidogrel) 75 Mg Tab, 75 MG PO DAILY, (Reported)


Gabapentin (Gabapentin) 300 Mg Capsule, 300 MG PO BID, (Reported)


Metronidazole (Flagyl) 500 Mg Tablet, 500 MG PO Q8H


   FOR 10 DAYS 





Scheduled PRN


Albuterol Sulfate (Ventolin Hfa) 108 Mcg/Act Aer, 2 PUFFS INH Q6H PRN for 

SOB/WHEEZING, (Reported)





Allergies


Coded Allergies:  


     No Known Allergies (Unverified , 3/15/19)











GONDAL,KHUBAIB N. MD            Jan 9, 2021 18:03

## 2021-03-05 ENCOUNTER — HOSPITAL ENCOUNTER (OUTPATIENT)
Dept: HOSPITAL 53 - M LABSMTC | Age: 51
End: 2021-03-05
Attending: ANESTHESIOLOGY
Payer: COMMERCIAL

## 2021-03-05 DIAGNOSIS — Z20.822: ICD-10-CM

## 2021-03-05 DIAGNOSIS — Z01.812: Primary | ICD-10-CM

## 2021-03-10 ENCOUNTER — HOSPITAL ENCOUNTER (OUTPATIENT)
Dept: HOSPITAL 53 - M OPP | Age: 51
Discharge: HOME | End: 2021-03-10
Attending: SURGERY
Payer: COMMERCIAL

## 2021-03-10 VITALS — DIASTOLIC BLOOD PRESSURE: 83 MMHG | SYSTOLIC BLOOD PRESSURE: 148 MMHG

## 2021-03-10 VITALS — HEIGHT: 71 IN | WEIGHT: 143 LBS | BODY MASS INDEX: 20.02 KG/M2

## 2021-03-10 DIAGNOSIS — K64.8: ICD-10-CM

## 2021-03-10 DIAGNOSIS — K57.30: ICD-10-CM

## 2021-03-10 DIAGNOSIS — R93.3: ICD-10-CM

## 2021-03-10 DIAGNOSIS — K57.32: ICD-10-CM

## 2021-03-10 DIAGNOSIS — D12.2: Primary | ICD-10-CM

## 2021-03-10 DIAGNOSIS — Z79.899: ICD-10-CM

## 2021-03-10 DIAGNOSIS — F17.210: ICD-10-CM

## 2021-03-10 DIAGNOSIS — J44.9: ICD-10-CM

## 2021-03-10 PROCEDURE — 45385 COLONOSCOPY W/LESION REMOVAL: CPT

## 2021-03-10 PROCEDURE — 45381 COLONOSCOPY SUBMUCOUS NJX: CPT

## 2021-03-10 PROCEDURE — 88305 TISSUE EXAM BY PATHOLOGIST: CPT

## 2021-03-10 NOTE — ROOR
________________________________________________________________________________

Patient Name: Kirk Macario           Procedure Date: 3/10/2021 9:28 AM

MRN: [id number]                          Account Number: [id number]

YOB: 1970               Age: 50

Room: [facility]                            Gender: Male

Note Status: Finalized                 

________________________________________________________________________________

 

Procedure:            Colonoscopy

Indications:          Abnormal CT of the GI tract, Follow-up of diverticulitis

Providers:            Ronaldo Fields MD

Referring MD:         Enrike Ko Do

Requesting Provider:  

Medicines:            Monitored Anesthesia Care

Complications:        No immediate complications.

________________________________________________________________________________

Procedure:            Pre-Anesthesia Assessment:

                      - Prior to the procedure, a History and Physical was 

                      performed, and patient medications and allergies were 

                      reviewed. The patient is competent. The risks and 

                      benefits of the procedure and the sedation options and 

                      risks were discussed with the patient. All questions 

                      were answered and informed consent was obtained. Patient 

                      identification and proposed procedure were verified by 

                      the physician, the nurse and the anesthetist in the 

                      endoscopy suite. Mental Status Examination: alert and 

                      oriented. Airway Examination: normal oropharyngeal 

                      airway and neck mobility. Respiratory Examination: clear 

                      to auscultation. CV Examination: normal. Prophylactic 

                      Antibiotics: The patient does not require prophylactic 

                      antibiotics. Prior Anticoagulants: The patient has taken 

                      Plavix (clopidogrel), last dose was 5 days prior to 

                      procedure. ASA Grade Assessment: III - A patient with 

                      severe systemic disease. After reviewing the risks and 

                      benefits, the patient was deemed in satisfactory 

                      condition to undergo the procedure. The anesthesia plan 

                      was to use monitored anesthesia care (MAC). Immediately 

                      prior to administration of medications, the patient was 

                      re-assessed for adequacy to receive sedatives. The heart 

                      rate, respiratory rate, oxygen saturations, blood 

                      pressure, adequacy of pulmonary ventilation, and 

                      response to care were monitored throughout the 

                      procedure. The physical status of the patient was 

                      re-assessed after the procedure.

                      The Colonoscope was introduced through the anus and 

                      advanced to the cecum, identified by appendiceal orifice 

                      and ileocecal valve. The colonoscopy was technically 

                      difficult and complex due to multiple diverticula in the 

                      colon, associated chronic diverticulitis, narrowing of 

                      the lumen and the patient's anxiety. [Solution]. The 

                      quality of the bowel preparation was good.

                                                                                

Findings:

     Hemorrhoids were found on perianal exam.

     A few medium-mouthed diverticula were found in the sigmoid colon. 

     Lynn-diverticular erythema was seen.

     A 15 mm polyp was found in the ascending colon. The polyp was sessile. 

     The polyp was removed with a hot snare. The polyp was removed with a 

     saline injection-lift technique using a hot snare. The polyp was removed 

     with a piecemeal technique using a hot snare. Resection and retrieval 

     were complete. To close a defect after polypectomy, three hemostatic 

     clips were successfully placed (MR conditional). There was no bleeding 

     during the procedure.

     No additional abnormalities were found on retroflexion.

                                                                                

Impression:           - Hemorrhoids found on perianal exam.

                      - Mild diverticulosis in the sigmoid colon. 

                      Lynn-diverticular erythema was seen.

                      - One 15 mm polyp in the ascending colon, removed with a 

                      hot snare, removed using injection-lift and a hot snare 

                      and removed piecemeal using a hot snare. Resected and 

                      retrieved. Clips (MR conditional) were placed.

Recommendation:       - Discharge patient to home (ambulatory).

                      - Soft diet for 2 weeks.

                      - Use original regular Metamucil one teaspoon PO BID for 

                      6 weeks.

                                                                                

Procedure Code(s):    --- Professional ---

                      16273, Colonoscopy, flexible; with removal of tumor(s), 

                      polyp(s), or other lesion(s) by snare technique

                      38451, Colonoscopy, flexible; with directed submucosal 

                      injection(s), any substance

Diagnosis Code(s):    --- Professional ---

                      K64.9, Unspecified hemorrhoids

                      K63.5, Polyp of colon

                      K57.32, Diverticulitis of large intestine without 

                      perforation or abscess without bleeding

                      K57.30, Diverticulosis of large intestine without 

                      perforation or abscess without bleeding

                      R93.3, Abnormal findings on diagnostic imaging of other 

                      parts of digestive tract

 

CPT copyright 2019 [employer]. All rights reserved.

 

The codes documented in this report are preliminary and upon  review may 

be revised to meet current compliance requirements.

 

Ronaldo Fields MD

_______________________

Ronaldo Fields MD

3/10/2021 10:32:01 AM

Electronically signed by Ronaldo Fields MD

Number of Addenda: 0

 

Note Initiated On: 3/10/2021 9:28 AM

Estimated Blood Loss: Estimated blood loss: none.

## 2021-05-17 ENCOUNTER — HOSPITAL ENCOUNTER (OUTPATIENT)
Dept: HOSPITAL 53 - M OUTALCOH | Age: 51
End: 2021-05-17
Attending: PSYCHIATRY & NEUROLOGY
Payer: MEDICAID

## 2021-05-17 DIAGNOSIS — F10.10: Primary | ICD-10-CM

## 2021-05-25 ENCOUNTER — HOSPITAL ENCOUNTER (OUTPATIENT)
Dept: HOSPITAL 53 - M OUTALCOH | Age: 51
LOS: 6 days | End: 2021-05-31
Attending: PSYCHIATRY & NEUROLOGY
Payer: MEDICAID

## 2021-05-25 DIAGNOSIS — F17.200: ICD-10-CM

## 2021-05-25 DIAGNOSIS — F10.10: Primary | ICD-10-CM

## 2021-05-25 DIAGNOSIS — F12.10: ICD-10-CM

## 2021-06-22 ENCOUNTER — HOSPITAL ENCOUNTER (OUTPATIENT)
Dept: HOSPITAL 53 - M SFHCPLAZ | Age: 51
End: 2021-06-22
Attending: FAMILY MEDICINE
Payer: COMMERCIAL

## 2021-06-22 DIAGNOSIS — F10.10: Primary | ICD-10-CM

## 2021-06-22 DIAGNOSIS — Z13.220: ICD-10-CM

## 2021-06-22 DIAGNOSIS — F32.1: ICD-10-CM

## 2021-06-22 LAB
ALBUMIN SERPL BCG-MCNC: 4.2 GM/DL (ref 3.2–5.2)
ALT SERPL W P-5'-P-CCNC: 30 U/L (ref 12–78)
BASOPHILS # BLD AUTO: 0.1 10^3/UL (ref 0–0.2)
BASOPHILS NFR BLD AUTO: 0.8 % (ref 0–1)
BILIRUB SERPL-MCNC: 0.4 MG/DL (ref 0.2–1)
BUN SERPL-MCNC: 6 MG/DL (ref 7–18)
CALCIUM SERPL-MCNC: 8.8 MG/DL (ref 8.5–10.1)
CHLORIDE SERPL-SCNC: 103 MEQ/L (ref 98–107)
CHOLEST SERPL-MCNC: 220 MG/DL (ref ?–200)
CHOLEST/HDLC SERPL: 2.31 {RATIO} (ref ?–5)
CO2 SERPL-SCNC: 28 MEQ/L (ref 21–32)
CREAT SERPL-MCNC: 0.74 MG/DL (ref 0.7–1.3)
EOSINOPHIL # BLD AUTO: 0.1 10^3/UL (ref 0–0.5)
EOSINOPHIL NFR BLD AUTO: 0.9 % (ref 0–3)
GFR SERPL CREATININE-BSD FRML MDRD: > 60 ML/MIN/{1.73_M2} (ref 56–?)
GLUCOSE SERPL-MCNC: 103 MG/DL (ref 70–100)
HCT VFR BLD AUTO: 46.1 % (ref 42–52)
HDLC SERPL-MCNC: 95 MG/DL (ref 40–?)
HGB BLD-MCNC: 15.5 G/DL (ref 13.5–17.5)
LDLC SERPL CALC-MCNC: 106 MG/DL (ref ?–100)
LYMPHOCYTES # BLD AUTO: 1.6 10^3/UL (ref 1.5–5)
LYMPHOCYTES NFR BLD AUTO: 14.1 % (ref 24–44)
MCH RBC QN AUTO: 33.5 PG (ref 27–33)
MCHC RBC AUTO-ENTMCNC: 33.6 G/DL (ref 32–36.5)
MCV RBC AUTO: 99.8 FL (ref 80–96)
MONOCYTES # BLD AUTO: 1 10^3/UL (ref 0–0.8)
MONOCYTES NFR BLD AUTO: 9.1 % (ref 2–8)
NEUTROPHILS # BLD AUTO: 8.5 10^3/UL (ref 1.5–8.5)
NEUTROPHILS NFR BLD AUTO: 74.7 % (ref 36–66)
NONHDLC SERPL-MCNC: 125 MG/DL
PLATELET # BLD AUTO: 248 10^3/UL (ref 150–450)
POTASSIUM SERPL-SCNC: 3.7 MEQ/L (ref 3.5–5.1)
PROT SERPL-MCNC: 7.2 GM/DL (ref 6.4–8.2)
RBC # BLD AUTO: 4.62 10^6/UL (ref 4.3–6.1)
SODIUM SERPL-SCNC: 140 MEQ/L (ref 136–145)
TRIGL SERPL-MCNC: 95 MG/DL (ref ?–150)
TSH SERPL DL<=0.005 MIU/L-ACNC: 0.37 UIU/ML (ref 0.36–3.74)
WBC # BLD AUTO: 11.3 10^3/UL (ref 4–10)

## 2021-08-19 ENCOUNTER — HOSPITAL ENCOUNTER (EMERGENCY)
Dept: HOSPITAL 53 - M ED | Age: 51
Discharge: HOME | End: 2021-08-19
Payer: COMMERCIAL

## 2021-08-19 VITALS — WEIGHT: 150.31 LBS | HEIGHT: 71 IN | BODY MASS INDEX: 21.04 KG/M2

## 2021-08-19 VITALS — DIASTOLIC BLOOD PRESSURE: 101 MMHG | SYSTOLIC BLOOD PRESSURE: 149 MMHG

## 2021-08-19 DIAGNOSIS — Z79.899: ICD-10-CM

## 2021-08-19 DIAGNOSIS — R07.89: Primary | ICD-10-CM

## 2021-08-19 DIAGNOSIS — F17.200: ICD-10-CM

## 2021-08-19 DIAGNOSIS — T50.B95A: ICD-10-CM

## 2021-08-19 LAB
BASOPHILS # BLD AUTO: 0.1 10^3/UL (ref 0–0.2)
BASOPHILS NFR BLD AUTO: 0.8 % (ref 0–1)
CK MB CFR.DF SERPL CALC: 0.64
CK MB CFR.DF SERPL CALC: 0.92
CK MB SERPL-MCNC: < 1 NG/ML (ref ?–3.6)
CK MB SERPL-MCNC: < 1 NG/ML (ref ?–3.6)
CK SERPL-CCNC: 109 U/L (ref 39–308)
CK SERPL-CCNC: 155 U/L (ref 39–308)
EOSINOPHIL # BLD AUTO: 0.1 10^3/UL (ref 0–0.5)
EOSINOPHIL NFR BLD AUTO: 0.7 % (ref 0–3)
HCT VFR BLD AUTO: 43.6 % (ref 42–52)
HGB BLD-MCNC: 14.8 G/DL (ref 13.5–17.5)
LYMPHOCYTES # BLD AUTO: 1.5 10^3/UL (ref 1.5–5)
LYMPHOCYTES NFR BLD AUTO: 13.6 % (ref 24–44)
MCH RBC QN AUTO: 33.9 PG (ref 27–33)
MCHC RBC AUTO-ENTMCNC: 33.9 G/DL (ref 32–36.5)
MCV RBC AUTO: 99.8 FL (ref 80–96)
MONOCYTES # BLD AUTO: 0.9 10^3/UL (ref 0–0.8)
MONOCYTES NFR BLD AUTO: 8.2 % (ref 2–8)
NEUTROPHILS # BLD AUTO: 8.5 10^3/UL (ref 1.5–8.5)
NEUTROPHILS NFR BLD AUTO: 76.3 % (ref 36–66)
NT-PRO BNP: 110 PG/ML (ref ?–125)
PLATELET # BLD AUTO: 218 10^3/UL (ref 150–450)
RBC # BLD AUTO: 4.37 10^6/UL (ref 4.3–6.1)
TROPONIN I SERPL-MCNC: < 0.02 NG/ML (ref ?–0.1)
TROPONIN I SERPL-MCNC: < 0.02 NG/ML (ref ?–0.1)
WBC # BLD AUTO: 11.1 10^3/UL (ref 4–10)

## 2021-08-19 PROCEDURE — 82553 CREATINE MB FRACTION: CPT

## 2021-08-19 PROCEDURE — 87798 DETECT AGENT NOS DNA AMP: CPT

## 2021-08-19 PROCEDURE — 83880 ASSAY OF NATRIURETIC PEPTIDE: CPT

## 2021-08-19 PROCEDURE — 80047 BASIC METABLC PNL IONIZED CA: CPT

## 2021-08-19 PROCEDURE — 71275 CT ANGIOGRAPHY CHEST: CPT

## 2021-08-19 PROCEDURE — 71045 X-RAY EXAM CHEST 1 VIEW: CPT

## 2021-08-19 PROCEDURE — 99285 EMERGENCY DEPT VISIT HI MDM: CPT

## 2021-08-19 PROCEDURE — 96374 THER/PROPH/DIAG INJ IV PUSH: CPT

## 2021-08-19 PROCEDURE — 94760 N-INVAS EAR/PLS OXIMETRY 1: CPT

## 2021-08-19 PROCEDURE — 93041 RHYTHM ECG TRACING: CPT

## 2021-08-19 PROCEDURE — 93005 ELECTROCARDIOGRAM TRACING: CPT

## 2021-08-19 PROCEDURE — 82550 ASSAY OF CK (CPK): CPT

## 2021-08-19 PROCEDURE — 85025 COMPLETE CBC W/AUTO DIFF WBC: CPT

## 2021-08-19 NOTE — REP
INDICATION:

R/O PE



COMPARISON:

Multiple the latest 06/22/2020 standard contrast-enhanced chest CT



TECHNIQUE:

CT angiography of the chest after the intravenous administration of 75 cc Isovue 370.

Attention pulmonary arteries.



FINDINGS:

There is excellent visualization of the pulmonary arterial vasculature.  No focal

filling defects are present that would be considered consistent with acute pulmonary

emboli.  There is no mediastinal or hilar adenopathy.  There are no pleural or

pericardial effusions.  The thoracic aorta is within normal limits and unchanged from

the prior exam.  The osseous structures are intact.  The previously described left

10th rib fracture has healed.



Evaluation of the lung fields shows no abnormal nodules, masses, or opacities.  The

lingular curvilinear density seen previously has resolved.



IMPRESSION:

CT findings are within normal limits.  There is no acute disease.





<Electronically signed by Sammy Garcia > 08/19/21 [id number]

## 2021-08-19 NOTE — REP
INDICATION:

CHEST PAIN.



COMPARISON:

05/27/2014.



TECHNIQUE:

Single portable AP view of the chest was performed.



FINDINGS:

There is no acute infiltrate or pulmonary edema.  Lungs are clear.  The heart is not

significantly enlarged.  The mediastinal silhouette is unremarkable.  The visualized

osseous structures are intact.



IMPRESSION:

No acute pulmonary disease.





<Electronically signed by Giuseppe Treviño > 08/19/21 [id number]

## 2021-08-20 NOTE — ECGEPIP
[facility] - ED

                                       

                                       Test Date:    2021

Pat Name:     MIRIAM SALEEM           Department:   

Patient ID:   [id number]                 Room:         -

Gender:       Male                     Technician:   

:          1970               Requested By: MANDY ALMONTE

Order Number: [id number]     Reading MD:   Lay Galvez

                                 Measurements

Intervals                              Axis          

Rate:         95                       P:            68

OH:           168                      QRS:          18

QRSD:         84                       T:            64

QT:           346                                    

QTc:          434                                    

                           Interpretive Statements

Normal sinus rhythm

NSTTW abnormalities

similar 21

Electronically Signed on 2021 10:13:54 EDT by Lay Galvez

## 2021-08-20 NOTE — ECGEPIP
[facility] - ED

                                       

                                       Test Date:    2021

Pat Name:     MIRIAM SALEEM           Department:   

Patient ID:   [id number]                 Room:         -

Gender:       Male                     Technician:   

:          1970               Requested By: MANDY ALMONTE

Order Number: [id number]     Reading MD:   Lay Galvez

                                 Measurements

Intervals                              Axis          

Rate:         92                       P:            68

GA:           166                      QRS:          29

QRSD:         76                       T:            65

QT:           352                                    

QTc:          435                                    

                           Interpretive Statements

Normal sinus rhythm

NSTTW abnormalities

similar 16

Electronically Signed on 2021 10:12:34 EDT by Lay Galvez

## 2021-09-13 ENCOUNTER — HOSPITAL ENCOUNTER (OUTPATIENT)
Dept: HOSPITAL 53 - M LABSMTC | Age: 51
End: 2021-09-13
Attending: PEDIATRICS
Payer: COMMERCIAL

## 2021-09-13 DIAGNOSIS — Z20.822: Primary | ICD-10-CM

## 2021-11-16 ENCOUNTER — HOSPITAL ENCOUNTER (OUTPATIENT)
Dept: HOSPITAL 53 - M PLAIMG | Age: 51
End: 2021-11-16
Attending: STUDENT IN AN ORGANIZED HEALTH CARE EDUCATION/TRAINING PROGRAM
Payer: COMMERCIAL

## 2021-11-16 DIAGNOSIS — M54.40: Primary | ICD-10-CM

## 2021-11-16 NOTE — REPVR
PROCEDURE INFORMATION: 

Exam: MR Lumbar Spine Without Contrast 

Exam date and time: 11/16/2021 2:41 PM 

Age: 51 years old 

Clinical indication: Low back pain; Additional info: Sciatica pain 



TECHNIQUE: 

Imaging protocol: Multiplanar magnetic resonance images of the lumbar spine 

without intravenous contrast. 



COMPARISON: 

CR SPINE LS COMPLETE 10/26/2020 11:33 AM 



FINDINGS: 



Vertebral body heights are maintained. Multilevel Modic type 1 edematous 

degenerative endplate change. No cord compression. No abnormal cord signal. 

Conus medullaris terminates at the L1 level. Paravertebral soft tissues are 

unremarkable. 



L1-L2: Slight broad-based disc bulge without significant canal narrowing. Mild 

bilateral foraminal narrowing. 

L2-L3: Right paracentral disc protrusion superimposed over broad-based disc 

bulge effacing the right lateral recess with slight impingement upon the 

traversing right L3 nerve root. Moderate to severe right foraminal narrowing 

with impingement upon the exiting right L2 nerve root. 

L3-L4: Broad-based disc bulge and facet hypertrophy cause mild canal narrowing 

and mild-to-moderate bilateral foraminal narrowing. 

L4-L5: Central disc protrusion and facet hypertrophy causing mild canal 

narrowing and mild bilateral foraminal narrowing. 

L5-S1: Broad-based disc bulge and facet hypertrophy cause mild canal narrowing 

and mild-to-moderate bilateral foraminal narrowing. 



IMPRESSION: 

Multilevel spondylotic changes of the lumbar spine, including spondylotic 

related impingement of both the exiting right L2 and traversing right L3 nerve 

roots at the L2-L3 level, as detailed above.



Electronically signed by: Jaime Holden On 11/16/2021  18:01:49 PM

## 2021-11-22 ENCOUNTER — HOSPITAL ENCOUNTER (EMERGENCY)
Dept: HOSPITAL 53 - M ED | Age: 51
Discharge: LEFT BEFORE DISCHARGE | End: 2021-11-22
Payer: COMMERCIAL

## 2021-11-22 VITALS — SYSTOLIC BLOOD PRESSURE: 136 MMHG | DIASTOLIC BLOOD PRESSURE: 89 MMHG
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
## 2021-11-22 DIAGNOSIS — S02.831A: ICD-10-CM
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
## 2021-11-22 DIAGNOSIS — Z79.899: ICD-10-CM
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
## 2021-11-22 DIAGNOSIS — S01.112A: Primary | ICD-10-CM
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
## 2021-11-22 LAB
APAP SERPL-MCNC: < 2 UG/ML (ref 10–30)
BASOPHILS # BLD AUTO: 0.1 10^3/UL (ref 0–0.2)
BASOPHILS NFR BLD AUTO: 0.9 % (ref 0–1)
BUN SERPL-MCNC: 7 MG/DL (ref 7–18)
CALCIUM SERPL-MCNC: 8.7 MG/DL (ref 8.5–10.1)
CHLORIDE SERPL-SCNC: 110 MEQ/L (ref 98–107)
CO2 SERPL-SCNC: 29 MEQ/L (ref 21–32)
CREAT SERPL-MCNC: 0.78 MG/DL (ref 0.7–1.3)
EOSINOPHIL # BLD AUTO: 0.1 10^3/UL (ref 0–0.5)
EOSINOPHIL NFR BLD AUTO: 1.3 % (ref 0–3)
ETHANOL SERPL-MCNC: 0.32 % (ref 0–0.01)
GFR SERPL CREATININE-BSD FRML MDRD: > 60 ML/MIN/{1.73_M2} (ref 56–?)
GLUCOSE SERPL-MCNC: 103 MG/DL (ref 70–100)
HCT VFR BLD AUTO: 42.6 % (ref 42–52)
HGB BLD-MCNC: 14.4 G/DL (ref 13.5–17.5)
LYMPHOCYTES # BLD AUTO: 1.8 10^3/UL (ref 1.5–5)
LYMPHOCYTES NFR BLD AUTO: 20.7 % (ref 24–44)
MCH RBC QN AUTO: 34.2 PG (ref 27–33)
MCHC RBC AUTO-ENTMCNC: 33.8 G/DL (ref 32–36.5)
MCV RBC AUTO: 101.2 FL (ref 80–96)
MONOCYTES # BLD AUTO: 0.6 10^3/UL (ref 0–0.8)
MONOCYTES NFR BLD AUTO: 7 % (ref 2–8)
NEUTROPHILS # BLD AUTO: 6 10^3/UL (ref 1.5–8.5)
NEUTROPHILS NFR BLD AUTO: 69.9 % (ref 36–66)
PLATELET # BLD AUTO: 293 10^3/UL (ref 150–450)
POTASSIUM SERPL-SCNC: 3.7 MEQ/L (ref 3.5–5.1)
RBC # BLD AUTO: 4.21 10^6/UL (ref 4.3–6.1)
SALICYLATES SERPL-MCNC: 3 MG/DL (ref 5–30)
SODIUM SERPL-SCNC: 147 MEQ/L (ref 136–145)
WBC # BLD AUTO: 8.6 10^3/UL (ref 4–10)
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
## 2021-11-22 NOTE — CCD
Summarization of Episode Note

                             Created on: 2021



MIRIAM SALEEM

External Reference #: [id number]

: 1970

Sex: Male



Demographics





                          Address                   [address] [place]  [address]

 

                          Home Phone                [phone]

 

                          Preferred Language        Unknown

 

                          Marital Status            Unknown

 

                          Islam Affiliation     Unknown

 

                          Race                      White

 

                          Ethnic Group              Not  or 





Author





                          Author                    [facility]

ems

 

                          Organization              [facility]

ems

 

                          Address                   Unknown

 

                          Phone                     Unavailable







Support





                Name            Relationship    Address         Phone

 

                    MIRIAM SALEEM                [address] [place]  [address]                    [phone]

 

                    [facility]                [address]

[place]

[place]  [address]                    [phone]







Care Team Providers





                    Care Team Member Name Role                Phone

 

                    Rock Booth       Unavailable         [phone]







PROBLEMS





          Type      Condition ICD9-CM Code GFD98-OL Code Onset Dates Condition S

tatus W/U 

Status              Risk                SNOMED Code         Notes

 

          Problem   Occlusive disease of artery of upper extremity           I70

.209             Active    

confirmed                               [id number]            

 

           Problem    Erectile dysfunction, unspecified erectile dysfunction typ

e            N52.9                 

Active          confirmed                       [id number]        

 

          Problem   Cigarette nicotine dependence without complication          

 F17.210             Active    

confirmed                               24341157             

 

        Problem Injury of right hand, sequela         S69.91XS         Active  c

onfirmed         

[id number]                        

 

        Problem Nicotine dependence with current use         F17.200         Act

maria alejandra  confirmed         

[id number]                                

 

        Problem Back pain of lumbar region with sciatica         M54.40         

 Active  confirmed         

643061115                                

 

                Problem         COPD (chronic obstructive pulmonary disease) wit

h chronic bronchitis                 

J44.9                 Active     confirmed             [id number]   

 

       Problem Insomnia, unspecified type        G47.00        Active confirmed 

       020926438  

 

          Problem   Non-seasonal allergic rhinitis, unspecified trigger         

  J30.89              Active    

confirmed                               [id number]             

 

       Problem Neuropathic pain, leg, bilateral        G57.93        Active conf

irmed        47661397 



 

        Problem Carpal tunnel syndrome of right wrist         G56.01          Ac

tive  confirmed         

[id number]                          

 

       Problem Alcohol abuse        F10.10        Active confirmed        319387

05  

 

             Problem      Allergic rhinitis, unspecified seasonality, unspecifie

d trigger              J30.9        

             Active       confirmed                 27587146      

 

        Problem Degenerative disc disease, lumbar         M51.36          Active

  confirmed         82336452

                                         

 

       Problem Anxiety        F41.9         Active confirmed        56072726  

 

                Problem         Cigarette nicotine dependence with other nicotin

e-induced disorder                 

F17.218               Active     confirmed             85785362    

 

       Problem Colonic diverticular abscess        K57.20        Active confirme

d        999768332  

 

       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 

       63176158  

 

                          Problem                   Current moderate episode of 

major depressive disorder without prior 

episode         F32.1           Active  confirmed         89118739  







ALLERGIES

No Known Allergies



ENCOUNTERS from 1970 to 2021





             Encounter    Location     Date         Provider     Diagnosis

 

                    [facility]          [address] [facility] [phone] [place] [address] 

                          Rock Booth                







IMMUNIZATIONS





                Vaccine         Route           Administration Date Status

 

                Influenza 6mo & up Fluzone Unknown         Oct 03, 2017    Other

s







SOCIAL HISTORY

Tobacco Use:



                    Social History Observation Description         Date

 

                    Details (start date - stop date) Current Smoker       



Sex Assigned At Birth:



                          Social History Observation Description

 

                          Sex Assigned At Birth     Unknown



Education:



                    Question            Answer              Notes

 

                    Level of Education: Not finished High School 9



Audit



                    Question            Answer              Notes

 

                    Total Score:        8                    

 

                    Interpretation:     Simple Advice        



Language:



                    Question            Answer              Notes

 

                    Languages spoken:   English              



Druze:



                    Question            Answer              Notes

 

                    Druze            33 None              



Sexual Hx:



                    Question            Answer              Notes

 

                    Had sex in the last 12 months (vaginal, oral, or anal)? Yes 

                 

 

                    LMP:                -                    

 

                    Have you ever had an STD? No                   

 

                    with                Women only           

 

                    Use protection?     No                   



Drug and Alcohol



                    Question            Answer              Notes

 

                    Total Score:        0                    

 

                    Interpretation:     No problems reported  



Alcohol Screening:



                    Question            Answer              Notes

 

                    Did you have a drink containing alcohol in the past year? Ye

s                  

 

                    Points              4                    

 

                    Interpretation      Positive             

 

                          How often did you have six or more drinks on one occas

ion in the past year? Less

than monthly (1 point)                   

 

                                        How many drinks did you have on a typica

l day when you were drinking in the past

year?                     1 or 2 (0 points)          

 

                          How often did you have a drink containing alcohol in t

he past year? Two to three

times per week (3 points)                



Tobacco Use:



                    Question            Answer              Notes

 

                    Are you a:          current smoker       

 

                    Patient counseled on the dangers of tobacco use and urged to

 quit: 2019           

 

                    How many cigarettes a day do you smoke?                

 

 

                    Are you interested in quitting? Not ready to quit    

 

                    Counseled the patient on smoking effects, education provided

 2019           







REASON FOR REFERRAL

No Information



VITAL SIGNS

No information



MEDICATIONS





           Medication SIG (Take, Route, Frequency, Duration) Notes      Start Da

te End Date   

Status

 

                          Ventolin  (90 Base) MCG/ACT 2 puffs as needed I

nhalation every 4 hrs for 

30 days                                                         Active

 

             Plavix 75 MG 1 tablet Orally Once a day for 90 day(s) Not taking pe

r surgeon               

                                        Not-Taking

 

           Gabapentin 300 MG 1 capsule Orally TID for 30 days                   

               Active

 

                Breo Ellipta 100-25 MCG/INH 1 puff Inhalation Once a day for 30 

days                 29 Oct, 

2021                                                Active

 

           DULoxetine HCl 60 MG 1 capsule Orally Once a day for 30 day(s)       

                           Active

 

                tiZANidine HCl 2 MG 1 tablet as needed Orally Three times a day 

for 30 days                 29

Oct, 2021                                           Active







PROCEDURES

No Information



RESULTS

No Results



REASON FOR VISIT

MRI lumbar with and without



MEDICAL (GENERAL) HISTORY





                    Type                Description         Date

 

                    Medical History     Right fibula fracture, Left leg fracture

  

 

                    Medical History     Depression and Anxiety secondary to loss

 of son in   

 

                    Medical History     ADHD                 

 

                    Medical History     ASCVD 10 YR RISK: 2.1%  

 

                          Medical History           Cigarette nicotine dependenc

e with other nicotine-induced 

disorder                                 

 

                    Medical History     Ischemia of digits of hand  

 

                    Medical History     Smoking              

 

                    Medical History     Thromboangitis Obliterans  

 

                    Surgical History    Pins and rods placed in left leg 

 

                    Surgical History    Right hand surgery  

 

                    Surgical History    Vasectomy           

 

                    Surgical History    diverticulitis      2021







Goals Section

No Information



Health Concerns

No Information



MEDICAL EQUIPMENT

No Information



MENTAL STATUS

No Information



FUNCTIONAL STATUS

No Information



ASSESSMENTS

No Information



PLAN OF TREATMENT

Medication



                Medication Name Sig             Start Date      Stop Date

 

                          Ventolin  (90 Base) MCG/ACT 2 puffs as needed I

nhalation every 4 hrs for 

30 days                                              

 

                DULoxetine HCl 60 MG 1 capsule Orally Once a day for 30 day(s)  

                

 

                    tiZANidine HCl 2 MG 1 tablet as needed Orally Three times a 

day for 30 days 29 

Oct, 2021                                

 

                    Breo Ellipta 100-25 MCG/INH 1 puff Inhalation Once a day for

 30 days 29 Oct, 

2021                                     

 

                Gabapentin 300 MG 1 capsule Orally TID for 30 days              

    



Next Appt



                                        Details

 

                                        Provider Name:Rock Booth, 2021 08

:15:00 AM, [address] [facility], [phone], [place], [address], [phone]

 

                                        Provider Name:Uday Mckee, 2022 

08:30:00 AM, [address], [phone], [place], [address], [phone]







Insurance Providers





             Payer Name   Payer Address Payer Phone  Insured Name Patient Relati

onship to 

Insured                   Coverage Start Date       Coverage End Date

 

                [facility] [address]  [place] [phone]

[phone]    

MIRIAM SALEEM [id number]

## 2021-11-22 NOTE — CCD
Summarization Of Episode

                             Created on: 2021



MIRIAM SUMMERS

External Reference #: [id number]

: 1970

Sex: Undifferentiated



Demographics





                          Address                   [address] [place]  [address]

 

                          Home Phone                [phone]

 

                          Preferred Language        English

 

                          Marital Status            Unknown

 

                          Restoration Affiliation     Unknown

 

                          Race                      Unknown

 

                          Ethnic Group              Not  or 





Author





                          Author                    [facility]

 

                          Organization              [facility]

 

                          Address                   Unknown

 

                          Phone                     Unavailable







Support





                Name            Relationship    Address         Phone

 

                DISABLED        Next Of Kin     Unknown         Unavailable

 

                    [facility] Next Of Kin         [address] [place] [address]                    [phone]

 

                    [facility] Next Of Kin         [address] [place]  [address]                    [phone]

 

                    [facility] Next Of Kin         [address] [place]  [address]                    [phone]

 

                CONTACTS,  NO   Next Of Kin     Unknown         Unavailable

 

                    [facility] Next Of Kin         [address]

[place] [address]                    [phone]

 

                    UNEMPLOYED          [place] [address]                    [phone]

 

                UE              Next Of Kin     Unknown         Unavailable

 

                    [facility] Next Of Kin         [address]

[place]  [address]                    [phone]

 

                    [facility]       Next Of Kin         [address]

[place]  [address]                    [phone]

 

                    [facility]                [address]

[place]  [address]                    Unavailable







Care Team Providers





                    Care Team Member Name Role                Phone

 

                    CAMI DUFFY MD Unavailable         Unavailable

 

                    CAMI DUFFY MD Unavailable         Unavailable

 

                    CAMI DUFFY MD Unavailable         Unavailable

 

                    CAMI DUFFY MD Unavailable         Unavailable

 

                    CAMI DUFFY MD Unavailable         Unavailable

 

                    CAMI DUFFY MD Unavailable         Unavailable

 

                    CAMI DUFFY MD Unavailable         Unavailable

 

                    CAMI DUFFY MD Unavailable         Unavailable

 

                    CAMI DUFFY MD Unavailable         Unavailable

 

                    CAMI DUFFY MD Unavailable         Unavailable

 

                    CAMI DUFFY MD Unavailable         Unavailable

 

                    CAMI DUFFY MD Unavailable         Unavailable

 

                    CAMI DUFFY MD Unavailable         Unavailable

 

                    BARAYUGACAMI MD Unavailable         Unavailable

 

                    BARAYUGACAMI MD Unavailable         Unavailable

 

                    BARAYUGACAMI MD Unavailable         Unavailable

 

                    BARAYUGA, CAMI LIZARRAGA MD Unavailable         Unavailable

 

                    BARAYUGA, CAMI LIZARRAGA MD Unavailable         Unavailable

 

                    BARAYUGACAMI MD Unavailable         Unavailable

 

                    RGAYUGACAMI MD Unavailable         Unavailable

 

                    BARAYUGACAMI MD Unavailable         Unavailable

 

                    BARAYUGA, CAMI LIZARRAGA MD Unavailable         Unavailable

 

                    BARAYUGA, CAMI LIZARRAGA MD Unavailable         Unavailable

 

                    BARAYUGA, CAMI LIZARRAGA MD Unavailable         Unavailable

 

                    BARAYUGACAMI MD Unavailable         Unavailable

 

                    BARAYUGA, CAMI LIZARRAGA MD Unavailable         Unavailable

 

                    BARAYUGA, CAMI LIZARRAGA MD Unavailable         Unavailable

 

                    BARAYUGA, CAMI LIZARRAGA MD Unavailable         Unavailable

 

                    BARAYUGA, CAMI LIZARRAGA MD Unavailable         Unavailable

 

                    GRAYUGA, CAMI LIZARRAGA MD Unavailable         Unavailable

 

                    GRAYUGACAMI MD Unavailable         Unavailable

 

                    GRAYUGACAMI MD Unavailable         Unavailable

 

                    GRAYUGACAMI MD Unavailable         Unavailable

 

                    GRAYUGACAMI MD Unavailable         Unavailable

 

                    GRAYUGACAMI MD Unavailable         Unavailable

 

                    SubramanianCODY MD   Unavailable         Unavailable

 

                    SubramanianCODY MD   Unavailable         Unavailable

 

                    SubramanianCODY MD   Unavailable         Unavailable

 

                    SubramanianCODY MD   Unavailable         Unavailable

 

                    SubramanianCODY MD   Unavailable         Unavailable

 

                    SubramanianCODY MD   Unavailable         Unavailable

 

                    SubramanianCODY MD   Unavailable         Unavailable

 

                    SubramanianCODY MD   Unavailable         Unavailable

 

                    CODY Subramanian MD   Unavailable         Unavailable

 

                    CODY Subramanian MD   Unavailable         Unavailable

 

                    SurbamanianCODY damain MD   Unavailable         Unavailable

 

                    SubramanianCODY MD   Unavailable         Unavailable

 

                    SubramanianCODY MD   Unavailable         Unavailable

 

                    SubramanianCODY MD   Unavailable         Unavailable

 

                    SubramanianCODY MD   Unavailable         Unavailable

 

                    SubramanianCODY MD   Unavailable         Unavailable

 

                    SubramanianCODY MD   Unavailable         Unavailable

 

                    SubramanianCODY damian MD   Unavailable         Unavailable

 

                    SubramanianCODY damian MD   Unavailable         Unavailable

 

                    SubramanianCODY damian MD   Unavailable         Unavailable

 

                    SubramanianCODY damian MD   Unavailable         Unavailable

 

                    SubramanianCODY MD   Unavailable         Unavailable

 

                    SubramanianCODY MD   Unavailable         Unavailable

 

                    SubramanianCODY MD   Unavailable         Unavailable

 

                    SubramanianCODY damian MD   Unavailable         Unavailable

 

                    CODY Subramanian MD   Unavailable         Unavailable

 

                    CODY Subramanian MD   Unavailable         Unavailable

 

                    CODY Subramanian MD   Unavailable         Unavailable

 

                    SubramanianCODY MD   Unavailable         Unavailable

 

                    SubramanianCODY MD   Unavailable         Unavailable

 

                    SubramanianCODY MD   Unavailable         Unavailable

 

                    Subramanian, L Silverio MD   Unavailable         Unavailable

 

                    Subramanian, L Silverio MD   Unavailable         Unavailable

 

                    Subramanian, L Silverio MD   Unavailable         Unavailable

 

                    Subramanian, L Silverio MD   Unavailable         Unavailable

 

                    Subramanian, L Silverio MD   Unavailable         Unavailable

 

                    Subramanian, L Silverio MD   Unavailable         Unavailable

 

                    Subramanian, L Silverio MD   Unavailable         Unavailable

 

                    Subramanian, L Silverio MD   Unavailable         Unavailable

 

                    Subramanian, L Silverio MD   Unavailable         Unavailable

 

                    Subramanian, L Silverio MD   Unavailable         Unavailable

 

                    Subramanian, L Silverio MD   Unavailable         Unavailable

 

                    Subramanian, L Silverio MD   Unavailable         Unavailable

 

                    Subramanian, L Silverio MD   Unavailable         Unavailable

 

                    Subramanian, L Silverio MD   Unavailable         Unavailable

 

                    Subramanian, L Silverio MD   Unavailable         Unavailable

 

                    Subramanian, L Silverio MD   Unavailable         Unavailable

 

                    Subramanian, L Silverio MD   Unavailable         Unavailable

 

                    Subramanian, L Silverio MD   Unavailable         Unavailable

 

                    Subramanian, L Silverio MD   Unavailable         Unavailable



                                  



Re-disclosure Warning

          The records that you are about to access may contain information from 
federally-assisted alcohol or drug abuse programs. If such information is 
present, then the following federally mandated warning applies: This information
has been disclosed to you from records protected by federal confidentiality 
rules (42 CFR part 2). The federal rules prohibit you from making any further 
disclosure of this information unless further disclosure is expressly permitted 
by the written consent of the person to whom it pertains or as otherwise 
permitted by 42 CFR part 2. A general authorization for the release of medical 
or other information is NOT sufficient for this purpose. The Federal rules 
restrict any use of the information to criminally investigate or prosecute any 
alcohol or drug abuse patient.The records that you are about to access may 
contain highly sensitive health information, the redisclosure of which is 
protected by Article 27-F of the [facility] Public Health law. If you 
continue you may have access to information: Regarding HIV / AIDS; Provided by 
facilities licensed or operated by the [facility] Office of Mental Health; 
or Provided by the [facility] Office for People With Developmental 
Disabilities. If such information is present, then the following [place] 
mandated warning applies: This information has been disclosed to you from 
confidential records which are protected by state law. State law prohibits you 
from making any further disclosure of this information without the specific 
written consent of the person to whom it pertains, or as otherwise permitted by 
law. Any unauthorized further disclosure in violation of state law may result in
a fine or snf sentence or both. A general authorization for the release of 
medical or other information is NOT sufficient authorization for further disc
losure.                                                                         
    



Family History

          



             Family Member Name Family Member Gender Family Member Status Date o

f Status 

Description                             Data Source(s)

 

           Unknown    Unknown    Problem                          [facility] ([facility]



                                                                                
       



Encounters

          



           Encounter  Providers  Location   Date       Indications Data Source(s

)

 

                Unknown                         [address] [facility] [address] 2021 12:00:00 AM 

EDT                                                 [facility]CW1 ([facility]

 

                Unknown                         [address] [facility] [address]

[place] [address] 2021 12:00:00 AM 

EDT                                                 eCW1 ([facility]

 

                Outpatient                      [address] [facility] [address]

[place] [address] 2021 12:00:00 AM

EDT                                                 e[facility] ([facility]

 

                Outpatient                      [address] [facility] [place] [address] 2021 12:00:00 AM

EDT                                                 [facility]

 

                Unknown                         [address] [facility] [place] [address] 2021 12:00:00 AM 

EDT                                                 eCW1 ([facility]

 

                Outpatient                      [address] [facility] [address]

[place] [address] 2021 12:00:00 AM

EST                                                 eCW1 ([facility]

 

                Unknown                         [address] [facility] [address]

[place] [address] 2021 12:00:00 AM 

EST                                                 eCW1 ([facility]

 

                Outpatient                      [address] [facility] [address]

[place] [address] 2021 12:00:00 AM

EST                                                 eCW1 ([facility])

 

                Outpatient      Attender: ZIA Javier/Rell/Phliip/

Reindl 2021 

02:45:00 PM EST                                     [facility]

 

                Outpatient      Attender: ZIA Javier/Rell/Philip/

Reindl 2021 

09:45:00 AM EST                                     [facility]

 

                Unknown                         [address] [facility] [place] [address] 2021 12:00:00 AM 

EST                                                 eCW1 ([facility])

 

                Unknown                         [address] [facility] [place] [address] 2021 12:00:00 AM 

EST                                                 eCW1 ([facility])

 

             Outpatient   Attender: Silverio Subramanian MD Physical Therapy 2020 0

8:00:00 AM EST 

                                        [facility] ([facility])

 

             Outpatient   Attender: Silverio Subramanian MD Physical Therapy 2020 0

9:15:00 AM EST 

                                        [facility] ([facility])

 

                Unknown                         [address] [facility] [place] [address] 2020 12:00:00 AM 

EST                                                 eCW1 ([facility])

 

                Outpatient                      [address] [facility] [address]

[place] [address] 10/26/2020 12:00:00 AM

EDT                                                 eCW1 ([facility])

 

                Office Visit    Attender: Silverio Subramanian MD Physical Therapy 10/23/

2020 08:15:00 AM 

EDT                                                 [facility]

 

                Unknown                         [address] [facility] [place] [address] 10/20/2020 12:00:00 AM 

EDT                                                 eCW1 ([facility])



                                                                                
                                                                                
                                                                                
               



Immunizations

          



             Vaccine      Date         Status       Description  Data Source(s)

 

             COVID-19 VACCINE Moderna 2021 12:00:00 AM EDT completed      

           [facility]

 

                                        Vaccine Series Complete: YESThis Data wa

s Submitted to [facility]. 

 

             COVID-19 VACCINE Moderna 2021 12:00:00 AM EDT completed      

           [facility]

 

                                        Vaccine Series Complete: NOThis Data was

 Submitted to [facility]. 



                                                                                
                 



Medications

          



          Medication Brand Name Start Date Product Form Dose      Route     Admi

nistrative 

Instructions Pharmacy Instructions Status     Indications Reaction   Description

 Data 

Source(s)

 

           tizanidine 2 MG Oral Tablet TIZANIDINE HCL 10/30/2021 12:00:00 AM EDT

 tablet     90         

                          TAKE ONE TABLET BY MOUTH THREE TIMES A DAY AS NEEDED T

JADE ONE TABLET BY MOUTH 

THREE TIMES A DAY AS NEEDED SOLD: 2021                                    

    [facility]

 

          90 mcg/actuation           10/30/2021 12:00:00 AM EDT HFA aerosol inha

ler 18                  INHALE 2 

PUFFS BY MOUTH EVERY 4 HOURS AS NEEDED  INHALE 2 PUFFS BY MOUTH EVERY 4 HOURS AS

 

NEEDED       SOLD: 2021                                        [facility]

[facility]

 

          60 mg               10/30/2021 12:00:00 AM EDT capsule,delayed release

(DR/EC) 30                  TAKE ONE 

CAPSULE BY MOUTH EVERY DAY TAKE ONE CAPSULE BY MOUTH EVERY DAY SOLD: 2021 

   

                                                            [facility]

 

          300 mg              10/29/2021 12:00:00 AM EDT capsule   90           

       TAKE ONE CAPSULE BY MOUTH THREE

TIMES A DAY TAKE ONE CAPSULE BY MOUTH THREE TIMES A DAY SOLD: 2021        

                          

[facility]

 

                                        30 ACTUAT fluticasone furoate 0.1 MG/ACT

UAT / vilanterol 0.025 MG/ACTUAT Dry 

Powder Inhaler [Breo] Breo Ellipta 100-25 MCG/INH Breo Ellipta 100-25 MCG/INH 

10/29/2021 12:00:00 AM EDT         1.0 {puff}                         active    

              Breo Ellipta 100-25 

MCG/INH                                 eCW1 ([facility])

 

                    tizanidine 2 MG Oral Tablet tiZANidine HCl 2 MG tiZANidine H

Cl 2 MG 10/29/2021 

12:00:00 AM EDT        1.0 {tablet_as_needed}                      active       

        tiZANidine HCl 2 MG 

eCW1 ([facility])

 

                    Methocarbamol 500 MG Oral Tablet Methocarbamol 500 MG 2021 12:00:00 AM EDT

              1.0 {tablet}                      active               Methocarbam

ol 500 MG eCW1 ([facility])

 

                    Methocarbamol 500 MG Oral Tablet Methocarbamol 500 MG 2021 12:00:00 AM EDT

              1.0 {tablet}                      active               Methocarbam

ol 500 MG eCW1 ([facility])

 

                          duloxetine 30 MG Delayed Release Oral Capsule DULoxeti

ne HCl 30 MG DULoxetine 

HCl 30 MG 2021 12:00:00 AM EDT        1.0 {capsule}                      a

ctive               DULoxetine

 HCl 30 MG                              eCW1 ([facility])

 

          30 mg               2021 12:00:00 AM EDT capsule,delayed release

(DR/EC) 30                  TAKE ONE 

CAPSULE BY MOUTH EVERY DAY TAKE ONE CAPSULE BY MOUTH EVERY DAY SOLD: 2021 

   

                                                            [facility]

 

          90 mcg/actuation           2021 12:00:00 AM EDT HFA aerosol inha

ler 18                  INHALE 2 

PUFFS BY MOUTH EVERY 4 HOURS AS NEEDED  INHALE 2 PUFFS BY MOUTH EVERY 4 HOURS AS

 

NEEDED       SOLD: 2021                                        [facility]

[facility]

 

          90 mcg/actuation           2021 12:00:00 AM EDT HFA aerosol inha

ler 18                  INHALE 2 

PUFFS BY MOUTH EVERY 4 HOURS AS NEEDED  INHALE 2 PUFFS BY MOUTH EVERY 4 HOURS AS

 

NEEDED       SOLD: 10/07/2021                                        [facility]

[facility]

 

          500 mg              2021 12:00:00 AM EDT tablet    90           

       TAKE ONE TABLET BY MOUTH EVERY 8

 HOURS     TAKE ONE TABLET BY MOUTH EVERY 8 HOURS SOLD: 2021              

                    [facility]

 

                          duloxetine 30 MG Delayed Release Oral Capsule DULoxeti

ne HCl 30 MG DULoxetine 

HCl 30 MG 2021 12:00:00 AM EDT        1.0 {capsule}                      a

ctive               DULoxetine

 HCl 30 MG                              eCW1 ([facility])

 

          300 mg              2021 12:00:00 AM EDT capsule   90           

       TAKE ONE CAPSULE BY MOUTH THREE

 TIMES A DAY TAKE ONE CAPSULE BY MOUTH THREE TIMES A DAY SOLD: 2021       

                           

[facility]

 

          90 mcg/actuation           2021 12:00:00 AM EDT HFA aerosol inha

ler 18                  INHALE 2 

PUFFS BY MOUTH EVERY 4 HOURS AS NEEDED  INHALE 2 PUFFS BY MOUTH EVERY 4 HOURS AS

 

NEEDED       SOLD: 2021                                        [facility]

[facility]

 

                Escitalopram 5 MG Oral Tablet ESCITALOPRAM OXALATE 2021 12

:00:00 AM EDT 

tablet          30                              TAKE ONE TABLET BY MOUTH EVERY D

AY TAKE ONE TABLET BY MOUTH EVERY 

DAY          SOLD: 2021                                        [facility]

[facility]

 

                    Escitalopram 5 MG Oral Tablet [Lexapro] Lexapro 5 MG Lexapro

 5 MG        2021 

12:00:00 AM EDT        1.0 {tablet}                      active               Le

xapro 5 MG eCW1 ([facility])

 

                Metronidazole 500 MG Oral Tablet METRONIDAZOLE   03/10/2021 12:0

0:00 AM EST tablet

                30                              TAKE ONE TABLET BY MOUTH THREE T

IMES A DAY FOR 10 DAYS TAKE ONE TABLET BY 

MOUTH THREE TIMES A DAY FOR 10 DAYS SOLD: 2021                            

            [facility]

 

          500 mg              03/10/2021 12:00:00 AM EST tablet    20           

       TAKE ONE TABLET BY MOUTH TWICE A

 DAY FOR 10 DAYS          TAKE ONE TABLET BY MOUTH TWICE A DAY FOR 10 DAYS SOLD:

 

2021                                                      [facility]

 

          Metronidazole 500 MG Oral Tablet Metronidazole 03/10/2021 12:00:00 AM 

EST                               

                      completed                                   [facility] ([facility] )

 

           Ciprofloxacin 500 MG Oral Tablet [Cipro] Cipro      03/10/2021 12:00:

00 AM EST                       

ORAL                          completed                               [facility] ([facility] )

 

          300 mg              2021 12:00:00 AM EST capsule   90           

       TAKE ONE CAPSULE BY MOUTH THREE

 TIMES A DAY TAKE ONE CAPSULE BY MOUTH THREE TIMES A DAY SOLD: 2021       

                           

[facility]

 

          300 mg              2021 12:00:00 AM EST capsule   90           

       TAKE ONE CAPSULE BY MOUTH THREE

 TIMES A DAY TAKE ONE CAPSULE BY MOUTH THREE TIMES A DAY SOLD: 2021       

                           

[facility]

 

          300 mg              2021 12:00:00 AM EST capsule   60           

       TAKE ONE CAPSULE BY MOUTH TWICE

 A DAY     TAKE ONE CAPSULE BY MOUTH TWICE A DAY SOLD: 2021               

                   [facility]

 

                    Metronidazole 500 MG Oral Tablet Metronidazole 500 MG 2021 12:00:00 AM EST

              1.0 {tablet}                      suspended               Metronid

azole 500 MG eCW1 ([facility])

 

                    Metronidazole 500 MG Oral Tablet Metronidazole 500 MG 2021 12:00:00 AM EST

              1.0 {tablet}                      active               Metronidazo

le 500 MG eCW1 ([facility])

 

                          Ciprofloxacin 500 MG Oral Tablet Ciprofloxacin HCl 500

 MG Ciprofloxacin HCl 500 

MG     2021 12:00:00 AM EST        1.0 {tablet}                      activ

e               Ciprofloxacin HCl 

500 MG                                  eCW1 ([facility])

 

          500 mg              2021 12:00:00 AM EST tablet    20           

       TAKE ONE TABLET BY MOUTH TWO 

TIMES A DAY TAKE ONE TABLET BY MOUTH TWO TIMES A DAY SOLD: 2021           

                       

[facility]

 

                Metronidazole 500 MG Oral Tablet METRONIDAZOLE   2021 12:0

0:00 AM EST tablet

                30                              TAKE ONE TABLET BY MOUTH EVERY 8

 HOURS FOR 10 DAYS TAKE ONE TABLET BY 

MOUTH EVERY 8 HOURS FOR 10 DAYS SOLD: 2021                                

        [facility]

 

                    Metronidazole 500 MG Oral Tablet Metronidazole 500 MG 2021 12:00:00 AM EST

              1.0 {tablet}                      active               Metronidazo

le 500 MG eCW1 ([facility])

 

                          Ciprofloxacin 500 MG Oral Tablet Ciprofloxacin HCl 500

 MG Ciprofloxacin HCl 500 

MG     2021 12:00:00 AM EST        1.0 {tablet}                      activ

e               Ciprofloxacin HCl 

500 MG                                  eCW1 ([facility])

 

                          Ciprofloxacin 500 MG Oral Tablet Ciprofloxacin HCl 500

 MG Ciprofloxacin HCl 500 

MG     2021 12:00:00 AM EST        1.0 {tablet}                      suspe

nded               Ciprofloxacin 

HCl 500 MG                              eCW1 ([facility])

 

                          Ciprofloxacin 500 MG Oral Tablet Ciprofloxacin HCl 500

 MG Ciprofloxacin HCl 500 

MG     2021 12:00:00 AM EST        1.0 {tablet}                      suspe

nded               Ciprofloxacin 

HCl 500 MG                              eCW1 ([facility])

 

                    Metronidazole 500 MG Oral Tablet Metronidazole 500 MG 2021 12:00:00 AM EST

              1.0 {tablet}                      suspended               Metronid

azole 500 MG eCW1 ([facility])

 

             Diclofenac Sodium 0.01 MG/MG Topical Gel Voltaren     2020 12

:00:00 AM EST               

                                active                          [facility] ([facility])

 

                    gabapentin 300 MG Oral Capsule Gabapentin 300 MG Gabapentin 

300 MG   10/26/2020 

12:00:00 AM EDT        1.0 {capsule}                      active               G

abapentin 300 MG eCW1 

([facility])

 

          300 mg              10/26/2020 12:00:00 AM EDT capsule   60           

       TAKE ONE CAPSULE BY MOUTH TWICE

 A DAY     TAKE ONE CAPSULE BY MOUTH TWICE A DAY SOLD: 10/27/2020               

                   [facility]

 

          75 mg               10/26/2020 12:00:00 AM EDT tablet    30           

       TAKE ONE TABLET BY MOUTH EVERY 

DAY        TAKE ONE TABLET BY MOUTH EVERY DAY SOLD: 2021                  

                [facility]

 

          90 mcg/actuation           10/26/2020 12:00:00 AM EDT HFA aerosol inha

ler 18                  INHALE 

TWO PUFFS BY MOUTH EVERY 4 HOURS AS NEEDED INHALE TWO PUFFS BY MOUTH EVERY 4 

HOURS AS NEEDED SOLD: 10/27/2020                                        [facility]

 

          75 mg               10/26/2020 12:00:00 AM EDT tablet    30           

       TAKE ONE TABLET BY MOUTH EVERY 

DAY        TAKE ONE TABLET BY MOUTH EVERY DAY SOLD: 2021                  

                [facility]

 

          75 mg               10/26/2020 12:00:00 AM EDT tablet    90           

       TAKE ONE TABLET BY MOUTH EVERY 

DAY        TAKE ONE TABLET BY MOUTH EVERY DAY SOLD: 10/27/2020                  

                [facility]

 

          90 mcg/actuation           10/26/2020 12:00:00 AM EDT HFA aerosol inha

ler 18                  INHALE 

TWO PUFFS BY MOUTH EVERY 4 HOURS AS NEEDED INHALE TWO PUFFS BY MOUTH EVERY 4 

HOURS AS NEEDED SOLD: 2021                                        [facility]

 

          90 mcg/actuation           10/26/2020 12:00:00 AM EDT HFA aerosol inha

ler 18                  INHALE 

TWO PUFFS BY MOUTH EVERY 4 HOURS AS NEEDED INHALE TWO PUFFS BY MOUTH EVERY 4 

HOURS AS NEEDED SOLD: 2021                                        [facility]

 

                    gabapentin 300 MG Oral Capsule Gabapentin 300 MG Gabapentin 

300 MG   10/26/2020 

12:00:00 AM EDT        1.0 {capsule}                      active               G

abapentin 300 MG eCW1 

([facility])

 

          75 mg               2020 12:00:00 AM EDT tablet    30           

       TAKE ONE TABLET BY MOUTH EVERY 

DAY        TAKE ONE TABLET BY MOUTH EVERY DAY SOLD: 2020                  

                [facility]

 

          90 mcg/actuation           2019 12:00:00 AM EST HFA aerosol inha

ler 18                  INHALE 2 

PUFFS BY MOUTH EVERY 4 HOURS AS NEEDED  INHALE 2 PUFFS BY MOUTH EVERY 4 HOURS AS

 

NEEDED       SOLD: 2020                                        [facility]

s



                                                                                
                                                                                
                                                                                
                                                                                
                                                                                
                                                                                
                                                



Insurance Providers

          



             Payer name   Policy type / Coverage type Policy ID    Covered party

 ID Covered 

party's relationship to lomax Policy L[place]             Plan Information

 

          [facility] [facility]           [id number]           [place]           

       [id number]

 

          [facility]       I         [id number]           Self                [id number]

 

          [facility] [employer] [facility]           [id number]           SP           

       [id number]

 

          [facility]       I         [id number]           Self                [id number]

 

                    [employer] [id number]          

                     

[id number]

 

                    [employer] [id number]          

                     

[id number]

 

                    [employer] [id number]          

                     

[id number]

 

                    [employer] [id number]          

                     

[id number]

 

                    [employer] [id number]          

                     

[id number]

 

                    [employer] [id number]          

                     

[id number]

 

                    [employer] [id number]          

                     

[id number]

 

                    [employer] [id number]          

                     

[id number]

 

                    [employer]icaid [id number]          

                     

[id number]

 

                    [employer]icaid [id number]          

                     

[id number]

 

                    [employer]icaid [id number]          

                     

[id number]

 

          [facility] [employer] ([employer])       

    [id number]     Self                 

 

          [employer] [place] [address]           [id number]           WI2      

           [id number]

 

          O BLUE            [id number]           [facility]                 [id number]

[id number]

 

          [employer] P         [id number]           S                   [id number]

[id number]

 

          O BLUE            [id number]           SP                  [id number]

[id number]

 

                              [id number]                               [id number]

 

          [facility]           [id number]           SP           

       [id number]

 

                              [id number]                               [id number]

 

          [facility] [employer] [facility]           [id number]           SP           

       [id number]

 

          [facility]           [id number]           WI2        

         [id number]

 

          [facility] O         [id number] S              

     [id number]



                                                                                
                                                                                
                                                                                
                                                                                
                  



Problems, Conditions, and Diagnoses

          



           Code       Display Name Description Problem Type Effective Dates Data

 Source(s)

 

             M51.36       [id number]     Degenerative disc disease, lumbar Problem

      10/29/2021 12:00:00 AM

 EDT                                    eCW1 ([facility])

 

                    F32.1               [id number]            Current moderate epi

sode of major depressive disorder without 

prior episode       Problem             2021 12:00:00 AM EDT eCW1 (Pending sale to [facility])

 

           I73.1      [id number]   Thromboangiitis obliterans Problem     12:00:00 AM EDT 

eCW1 ([facility])

 

           F10.10     [id number]   Alcohol abuse Problem    2021 12:00:00 AM

 EDT eCW1 ([facility])

 

             K57.20       [id number]    Colonic diverticular abscess Problem     

 2021 12:00:00 AM EST

                                        eCW1 ([facility])

 

                    F17.218             Nicotine dependence Cigarette nicotine d

ependence with other nicotine-

induced disorder    Problem             2021 12:00:00 AM EST eCW1 (Pending sale to [facility])

 

             G56.01       [id number] Carpal tunnel syndrome of right wrist 

Problem      10/26/2020 

12:00:00 AM EDT                         eCW1 ([facility])

 

             G57.93       [id number]     Neuropathic pain, leg, bilateral Problem 

     10/26/2020 12:00:00 AM 

EDT                                     eCW1 ([facility])



                                                                                
                                                                                
        



Surgeries/Procedures

          



             Procedure    Description  Date         Indications  Data Source(s)

 

                    Colonoscopy,W/Directed Submucosal Injections, Any Substance 

                    03/10/2021 

12:00:00 AM EST                                     [facility] ([facility]

[facility])

 

             Colonoscopy W/ Poly              03/10/2021 12:00:00 AM EST        

      [facility] ([facility] )

 

             MRI Upper Extremity Any Joint              12/15/2020 12:00:00 AM E

ST              [facility] ([facility] )

 

             RADEX HAND MINIMUM 3 VIEWS              2020 12:00:00 AM EST 

             [facility] ([facility] )

 

             Physical Therapy Eval - Low Complexity              2020 12:0

0:00 AM EST              [facility] 

([facility])



                                                                                
                                                



Results

          



                    ID                  Date                Data Source

 

                    [id number]            2021 03:42:00 PM EDT [facility]









          Name      Value     Range     Interpretation Code Description Data Katey

rce(s) Supporting 

Document(s)

 

          SARS-CoV-2 (COVID 19) NEGATIVE - SARS-CoV-2 (COVID19)                 

              [facility]     

 

                                        This lab was ordered by [facility] a

nd reported by [facility].

 









                    ID                  Date                Data Source

 

                    TSH                 2021 12:00:00 AM EDT eCW1 (Pending sale to [facility])









          Name      Value     Range     Interpretation Code Description Data Katey

rce(s) Supporting 

Document(s)

 

                      0.366      0.358-3.740            THYROID STIMULATING HORM

ONE eCW1 ([facility])                                  









                    ID                  Date                Data Source

 

                    LIPID PANEL (CARDIAC RISK) 2021 12:00:00 AM EDT eCW1 (

[facility])









          Name      Value     Range     Interpretation Code Description Data Katey

rce(s) Supporting 

Document(s)

 

             Triglyceride [Mass/volume] in Serum or Plasma by calculation 95    

       <150                      

TRIGLYCERIDES LEVEL       eCW1 ([facility])  

 

           Cholesterol in HDL [Moles/volume] in Serum or Plasma 95         >40  

                 HDL CHOLESTEROL 

eCW1 ([facility])    

 

           Cholesterol [Moles/volume] in Serum or Plasma 220        <200        

          CHOLESTEROL LEVEL eCW1 

([facility])         

 

             Cholesterol in LDL [Mass/volume] in Serum or Plasma by calculation 

106          <100                      

LDL CHOLESTEROL           eC1 ([facility])  

 

                    2.315     <5                  CHOLESTEROL RISK RATIO eCW1 ([facility])  

 

                    125                           NON-HDL-C eCW1 ([facility])  









                    ID                  Date                Data Source

 

                    GAMMA GLUTAMYLTRANSPEPTIDASE 2021 12:00:00 AM EDT eCW1

 ([facility])









          Name      Value     Range     Interpretation Code Description Data Katey

rce(s) Supporting 

Document(s)

 

                      142        15-85                 GAMMA GLUTAMYLTRANSPEPTID

ASE eCW1 ([facility])

                                         









                    ID                  Date                Data Source

 

                    Comprehensive Metabolic Profile (CMP) 2021 12:00:00 AM

 EDT eCW1 ([facility])









          Name      Value     Range     Interpretation Code Description Data Katey

rce(s) Supporting 

Document(s)

 

                    103                     GLUCOSE, FASTING eCW1 (Pending sale to [facility])  

 

                    6         7-18                BLOOD UREA NITROGEN eCW1 ([facility])  

 

                    0.74      0.70-1.30           CREATININE FOR GFR eCW1 ([facility])  

 

                    140       136-145             SODIUM LEVEL eCW1 ([facility])  

 

                    > 60.0    >56                 GLOMERULAR FILTRATION RATE eCW

1 ([facility]) 

 

 

                    28        21-32               CARBON DIOXIDE LEVEL eCW1 ([facility])  

 

                    3.7       3.5-5.1             POTASSIUM SERUM eCW1 ([facility])  

 

                    103                     CHLORIDE LEVEL eCW1 ([facility])  

 

                    28        7-37                AST/SGOT  eCW1 ([facility])  

 

                    8.8       8.5-10.1            CALCIUM LEVEL eCW1 ([facility])  

 

                    111                     ALKALINE PHOSPHATASE eCW1 ([facility])  

 

                    30        12-78               ALT/SGPT  eCW1 ([facility])  

 

                    7.2       6.4-8.2             TOTAL PROTEIN eCW1 ([facility])  

 

                    4.2       3.2-5.2             ALBUMIN   eCW1 ([facility])  

 

                    0.4       0.2-1.0             BILIRUBIN,TOTAL eCW1 ([facility])  

 

                    1.4                           ALBUMIN/GLOBULIN RATIO eCW1 ([facility])  









                    ID                  Date                Data Source

 

                    CBC with Differential 2021 12:00:00 AM EDT eCW1 ([facility])









          Name      Value     Range     Interpretation Code Description Data Katey

rce(s) Supporting 

Document(s)

 

                    11.3      4.0-10.0            WHITE BLOOD COUNT eCW1 ([facility])  

 

                    4.62      4.30-6.10           RED BLOOD COUNT eCW1 ([facility])  

 

                    15.5      13.5-17.5           HEMOGLOBIN eCW1 ([facility])  

 

                    46.1      42.0-52.0           HEMATOCRIT eCW1 ([facility])  

 

                      99.8       80.0-96.0             MEAN CORPUSCULAR VOLUME e

CW1 ([facility])

                                         

 

                      33.6       32.0-36.5             MEAN CORPUSCULAR HGB CONC

 eCW1 ([facility])                                  

 

                      33.5       27.0-33.0             MEAN CORPUSCULAR HEMOGLOB

IN eCW1 ([facility])                                  

 

                    248       150-450             PLATELET COUNT, AUTOMATED eCW1

 ([facility]) 

 

 

                      14.6       11.5-14.5             RED CELL DISTRIBUTION WID

TH eCW1 ([facility])                                  

 

                    74.7      36.0-66.0           NEUTROPHILS % eCW1 ([facility])  

 

                    14.1      24.0-44.0           LYMPH %   eCW1 ([facility])  

 

                    9.1       2.0-8.0             MONO %    eCW1 ([facility])  

 

                    0.9       0.0-3.0             EOS %     eCW1 ([facility])  

 

                    8.5       1.5-8.5             NEUTROPHILS # eCW1 ([facility])  

 

                    0.8       0.0-1.0             BASO %    eCW1 ([facility])  

 

                    1.0       0.0-0.8             MONO #    eCW1 ([facility])  

 

                    1.6       1.5-5.0             LYMPH #   eCW1 ([facility])  

 

                    0.1       0.0-0.5             EOS #     eCW1 ([facility])  

 

                    0.1       0.0-0.2             BASO #    eCW1 ([facility])  









                    ID                  Date                Data Source

 

                    [id number]         2021 11:00:00 AM EST [facility]









          Name      Value     Range     Interpretation Code Description Data Katey

rce(s) Supporting 

Document(s)

 

          SARS coronavirus 2 RNA Not Detected                               [facility]

[place]     

 

                                        This lab was ordered by [facility] and reported by [facility]. 









                    ID                  Date                Data Source

 

                    [id number]             2021 03:08:00 PM EST [facility]









          Name      Value     Range     Interpretation Code Description Data Katey

rce(s) Supporting 

Document(s)

 

                                        SARS coronavirus 2 RNA [Presence] in Res

piratory specimen by LAUREN with probe 

detection  NEGATIVE                                    [facility]      

 

                                        This lab was ordered by [facility] a

nd reported by [facility].

 







                                        Procedure

 

                                          



                                                                                
                                                                                
        



Social History

          



           Code       Duration   Value      Status     Description Data Source(s

)

 

           Smoking    10/29/2021 12:00:00 AM EDT Current Smoker completed  Curre

nt Smoker eCW1 

([facility])

 

           Smoking    2021 12:00:00 AM EDT Current Smoker completed  Curre

nt Smoker eCW1 

([facility])

 

           Smoking    2021 12:00:00 AM EDT Current Smoker completed  Curre

nt Smoker eCW1 

([facility])

 

           Smoking    2021 12:00:00 AM EDT Current Smoker completed  Curre

nt Smoker eCW1 

([facility])

 

           Smoking    2021 12:00:00 AM EST Current Smoker completed  Curre

nt Smoker eCW1 

([facility])

 

           Smoking    2021 12:00:00 AM EST Current Smoker completed  Curre

nt Smoker eCW1 

([facility])

 

           Smoking    2021 12:00:00 AM EST Current Smoker completed  Curre

nt Smoker eCW1 

([facility])

 

           Smoking    2021 12:00:00 AM EST Current Smoker completed  Curre

nt Smoker eCW1 

([facility])

 

           Smoking    2020 12:00:00 AM EST Current Smoker completed  Curre

nt Smoker eCW1 

([facility])

 

           Smoking    2020 12:00:00 AM EST Current Smoker completed  Curre

nt Smoker eCW1 

([facility])

 

           Smoking    10/26/2020 12:00:00 AM EDT Current Smoker completed  Curre

nt Smoker eCW1 

([facility])

 

           Smoking    10/26/2020 12:00:00 AM EDT Current Smoker completed  Curre

nt Smoker eCW1 

([facility])



                                                                                
                                                                                
                                                



Vital Signs

          



                    ID                  Date                Data Source

 

                    UNK                                      









           Name       Value      Range      Interpretation Code Description Data

 Source(s)

 

           Systolic blood pressure 110 mm[Hg]                       110 mm[Hg] e

CW1 ([facility])

 

           Diastolic blood pressure 70 mm[Hg]                        70 mm[Hg]  

eCW1 ([facility])

 

           Body weight 145.6 [lb_av]                       145.6 [lb_av] eCW1 ([facility]

 

           Body height 71 [in_i]                        71 [in_i]  eCW1 (Pending sale to [facility])

 

           Body mass index (BMI) [Ratio] 20.30 kg/m2                       20.30

 kg/m2 W1 ([facility])

 

           Heart rate 98 /min                          98 /min    eCW1 ([facility])

 

           Respiratory rate 18 /min                          18 /min    eCW1 ([facility])

 

           Body temperature 97.1 [degF]                       97.1 [degF] eCW1 (

[facility])

 

           Body weight 146.0 [lb_av]                       146.0 [lb_av] eCW1 ([facility]

 

           Body height 71 [in_i]                        71 [in_i]  eCW1 (Pending sale to [facility])

 

           Body mass index (BMI) [Ratio] 20.36 kg/m2                       20.36

 kg/m2 eCW1 ([facility])

 

           Heart rate 94 /min                          94 /min    eCW1 ([facility])

 

           Respiratory rate 18 /min                          18 /min    eCW1 ([facility])

 

           Body temperature 97.4 [degF]                       97.4 [degF] eCW1 (

[facility])

 

           Systolic blood pressure 138 mm[Hg]                       138 mm[Hg] e

CW1 ([facility])

 

           Diastolic blood pressure 88 mm[Hg]                        88 mm[Hg]  

eCW1 ([facility])

 

           Systolic blood pressure 126 mm[Hg]                       126 mm[Hg] M

EDENT ([facility], )

 

           Diastolic blood pressure 72 mm[Hg]                        72 mm[Hg]  

MEDENT ([facility])

 

           Heart [facility] 109 /min                         109 /[facility]   [facility]

 

           Body height 71 [in_i]                        71 [in_i]  [facility] ([facility])

 

                                        5'11" 

 

           Body weight 144.38 [lb_av]                       144.38 [lb_av] MEDEN

T ([facility])

 

           Body mass index (BMI) [Ratio] 20.1 kg/m2                       20.1 k

g/m2 [facility] ([facility])

 

           Ideal body weight 172 [lb_av]                       172 [lb_av] MEDEN

T ([facility])

 

           Body weight 65.489 kg                        65.489 kg  [facility] ([facility])

 

           Body surface area Derived from formula 1.84 m2                       

   1.84 m2    [facility] ([facility])

 

           Systolic blood pressure 124 mm[Hg]                       124 mm[Hg] e

CW1 ([facility])

 

           Body weight 151 [lb_av]                       151 [lb_av] eCW1 ([facility])

 

           Body height 71 [in_i]                        71 [in_i]  eCW1 ([facility] to [facility])

 

           Body mass index (BMI) [Ratio] 21.06 kg/m2                       21.06

 kg/m2 eCW1 ([facility])

 

           Heart rate 116 /min                         116 /min   eCW1 ([facility])

 

           Respiratory rate 18 /min                          18 /min    eCW1 ([facility])

 

           Body temperature 96.6 [degF]                       96.6 [degF] eCW1 (

[facility])

 

           Diastolic blood pressure 84 mm[Hg]                        84 mm[Hg]  

eCW1 ([facility])

 

           Body weight 152 [lb_av]                       152 [lb_av] eCW1 ([facility])

 

           Body height 71 [in_i]                        71 [in_i]  eCW1 ([facility] to [facility])

 

           Body mass index (BMI) [Ratio] 21.20 kg/m2                       21.20

 kg/m2 eCW1 ([facility])

 

           Heart rate 95 /min                          95 /min    eCW1 ([facility])

 

           Respiratory rate 17 /min                          17 /min    eCW1 ([facility])

 

           Body temperature 96.8 [degF]                       96.8 [degF] eCW1 (

[facility])

 

           Systolic blood pressure 118 mm[Hg]                       118 mm[Hg] e

CW1 ([facility])

 

           Diastolic blood pressure 80 mm[Hg]                        80 mm[Hg]  

eCW1 ([facility])

 

           Body weight 191.50 [lb_av]                       191.50 [lb_av] MEDEN

T ([facility])

 

           Body mass index (BMI) [Ratio] 26.7 kg/m2                       26.7 k

[facility]

 

           Ideal body weight 172 [lb_av]                       172 [lb_av] MEDEN

T ([facility])

 

           Body weight 86.864 kg                        86.864 kg  [facility] ([facility])

 

           Body surface area Derived from formula 2.07 m2                       

   2.07 m2    [facility] ([facility])

 

           Body height 71 [in_i]                        71 [in_i]  [facility]

 

                                        5'11" 

 

           Systolic blood pressure 103 mm[Hg]                       103 mm[Hg] M

EDENT ([facility])

 

           Diastolic blood pressure 73 mm[Hg]                        73 mm[Hg]  

[facility] ([facility])

 

           Heart rate 92 /min                          92 /min    [facility] ([facility])

 

           Systolic blood pressure 110 mm[Hg]                       110 mm[Hg] M

EDENT ([facility])

 

           Diastolic blood pressure 77 mm[Hg]                        77 mm[Hg]  

[facility] ([facility])

 

           Heart rate 88 /min                          88 /min    [facility] ([facility])

 

           Body height 71 [in_i]                        71 [in_i]  [facility] ([facility])

 

                                        5'11" 

 

           Body weight 148.50 [lb_av]                       148.50 [lb_av] MEDEN

T ([facility])

 

           Body mass index (BMI) [Ratio] 20.7 kg/m2                       20.7 k

g/m2 [facility] ([facility])

 

           Ideal body weight 172 [lb_av]                       172 [lb_av] MEDEN

T ([facility], )

 

           Body weight 67.360 kg                        67.360 kg  MEDENT ([facility], )

 

           Body surface area Derived from formula 1.86 m2                       

   1.86 m2    MEDENT ([facility], )

 

           Body temperature 97.3 [degF]                       97.3 [degF] MEDENT

 ([facility])

 

           Body weight 153 [lb_av]                       153 [lb_av] eCW1 ([facility])

 

           Body temperature 97.8 [degF]                       97.8 [degF] eCW1 (

[facility])

 

           Body height 71 [in_i]                        71 [in_i]  eCW1 ([facility] to [facility])

 

           Systolic blood pressure 136 mm[Hg]                       136 mm[Hg] e

CW1 ([facility])

 

           Diastolic blood pressure 80 mm[Hg]                        80 mm[Hg]  

eCW1 ([facility])

 

           Body mass index (BMI) [Ratio] 21.34 kg/m2                       21.34

 kg/m2 eCW1 ([facility])

 

           Heart rate 113 /min                         113 /min   eCW1 ([facility])

 

           Respiratory rate 18 /min                          18 /min    eCW1 ([facility])



                                                                                
                  



Patient Treatment Plan of Care

          



             Planned Activity Planned Date Details      Description  Data Source

(s)

 

             tizanidine 2 MG Oral Tablet 10/29/2021 12:00:00 AM EDT             

              eCW1 ([facility])

 

                                        30 ACTUAT fluticasone furoate 0.1 MG/ACT

UAT / vilanterol 0.025 MG/ACTUAT Dry 

Powder Inhaler [Breo] 10/29/2021 12:00:00 AM EDT                                

 eCW1 ([facility])

 

             Methocarbamol 500 MG Oral Tablet 2021 12:00:00 AM EDT        

                   eCW1 ([facility])

 

             duloxetine 30 MG Delayed Release Oral Capsule 2021 12:00:00 A

M EDT                           

eCW1 ([facility])

 

             Methocarbamol 500 MG Oral Tablet 2021 12:00:00 AM EDT        

                   eCW1 ([facility])

 

             duloxetine 30 MG Delayed Release Oral Capsule 2021 12:00:00 A

M EDT                           

eCW1 ([facility])

 

             Escitalopram 5 MG Oral Tablet [Lexapro] 2021 12:00:00 AM EDT 

                          eCW1 

([facility])

 

             gabapentin 300 MG Oral Capsule 10/26/2020 12:00:00 AM EDT          

                 eCW1 ([facility])

 

             gabapentin 300 MG Oral Capsule 10/26/2020 12:00:00 AM EDT          

                 eCW1 ([facility])

## 2021-11-22 NOTE — CCD
Summarization of Episode Note

                             Created on: 2021



MIRIAM SUMMERS

External Reference #: [id number]

: 1970

Sex: Male



Demographics





                          Address                   [address] [place]  [address]

 

                          Home Phone                [phone]

 

                          Preferred Language        Unknown

 

                          Marital Status            Unknown

 

                          Rastafari Affiliation     Unknown

 

                          Race                      White

 

                          Ethnic Group              Not  or 





Author





                          Author                    [facility]

ems

 

                          Organization              [facility]

ems

 

                          Address                   Unknown

 

                          Phone                     Unavailable







Support





                Name            Relationship    Address         Phone

 

                    MIRIAM SUMMERS                [address], [place]  [address] [phone]

 

                    [employer] [employer]                [address]

t [place]

[place]  [address]                    [phone]







Care Team Providers





                    Care Team Member Name Role                Phone

 

                    Rock Booth       Unavailable         [phone]







PROBLEMS





          Type      Condition ICD9-CM Code NJZ47-RL Code Onset Dates Condition S

tatus W/U 

Status              Risk                SNOMED Code         Notes

 

           Problem    Erectile dysfunction, unspecified erectile dysfunction typ

e            N52.9                 

Active          confirmed                       [id number]        

 

        Problem Injury of right hand, sequela         S69.91XS         Active  c

onfirmed         

[id number]                        

 

          Problem   Occlusive disease of artery of upper extremity           I70

.209             Active    

confirmed                               [id number]            

 

        Problem Back pain of lumbar region with sciatica         M54.40         

 Active  confirmed         

[id number]                                

 

          Problem   Cigarette nicotine dependence without complication          

 F17.210             Active    

confirmed                               [id number]             

 

       Problem Insomnia, unspecified type        G47.00        Active confirmed 

       [id number]  

 

        Problem Nicotine dependence with current use         F17.200         Act

maria alejandra  confirmed         

[id number]                                

 

             Problem      Allergic rhinitis, unspecified seasonality, unspecifie

d trigger              J30.9        

             Active       confirmed                 69757107      

 

          Problem   Non-seasonal allergic rhinitis, unspecified trigger         

  J30.89              Active    

confirmed                               42304250             

 

       Problem Neuropathic pain, leg, bilateral        G57.93        Active conf

irmed        67421522 



 

                          Problem                   Current moderate episode of 

major depressive disorder without prior 

episode         F32.1           Active  confirmed         92263248  

 

       Problem Anxiety        F41.9         Active confirmed        95494865  

 

       Problem Alcohol abuse        F10.10        Active confirmed        088145

05  

 

                Problem         COPD (chronic obstructive pulmonary disease) wit

h chronic bronchitis                 

J44.9                 Active     confirmed             049712406   

 

        Problem Carpal tunnel syndrome of right wrist         G56.01          Ac

tive  confirmed         

[id number]                          

 

                Problem         Cigarette nicotine dependence with other nicotin

e-induced disorder                 

F17.218               Active     confirmed             13529771    

 

       Problem Colonic diverticular abscess        K57.20        Active confirme

d        249490545  

 

       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 

       24370428  







ALLERGIES

No Known Allergies



ENCOUNTERS from 1970 to 2021





             Encounter    Location     Date         Provider     Diagnosis

 

                          [facility] Resident         [address] [facility] [address] 

[phone] [place] [address]

                    12 2021        Jack Barkin         Back pain of lumbar 

region with sciatica M54.40 ; COPD 

(chronic obstructive pulmonary disease) with chronic bronchitis J44.9 ; Current 
moderate episode of major depressive disorder without prior episode F32.1 and 
Neuropathic pain, leg, bilateral G57.93







IMMUNIZATIONS





                Vaccine         Route           Administration Date Status

 

                Influenza 6mo & up Fluzone Unknown         Oct 03, 2017    Other

s







SOCIAL HISTORY

Tobacco Use:



                    Social History Observation Description         Date

 

                    Details (start date - stop date) Current Smoker       



Sex Assigned At Birth:



                          Social History Observation Description

 

                          Sex Assigned At Birth     Unknown



Education:



                    Question            Answer              Notes

 

                    Level of Education: Not finished High School 9



Audit



                    Question            Answer              Notes

 

                    Total Score:        1                    

 

                    Interpretation:     Alcohol Education    



Language:



                    Question            Answer              Notes

 

                    Languages spoken:   English              



Spiritism:



                    Question            Answer              Notes

 

                    Spiritism            33 None              



Sexual Hx:



                    Question            Answer              Notes

 

                    Had sex in the last 12 months (vaginal, oral, or anal)? Yes 

                 

 

                    LMP:                -                    

 

                    Have you ever had an STD? No                   

 

                    with                Women only           

 

                    Use protection?     No                   



Drug and Alcohol



                    Question            Answer              Notes

 

                    Total Score:        0                    

 

                    Interpretation:     No problems reported  



Alcohol Screening:



                    Question            Answer              Notes

 

                    Did you have a drink containing alcohol in the past year? Ye

s                  

 

                    Points              4                    

 

                    Interpretation      Positive             

 

                          How often did you have six or more drinks on one occas

ion in the past year? Less

than monthly (1 point)                   

 

                                        How many drinks did you have on a typica

l day when you were drinking in the past

year?                     1 or 2 (0 points)          

 

                          How often did you have a drink containing alcohol in t

he past year? Two to three

times per week (3 points)                



Tobacco Use:



                    Question            Answer              Notes

 

                    Are you a:          current smoker       

 

                    Patient counseled on the dangers of tobacco use and urged to

 quit: 2019           

 

                    How many cigarettes a day do you smoke?                

 

 

                    Are you interested in quitting? Not ready to quit    

 

                    Counseled the patient on smoking effects, education provided

 2019           







REASON FOR REFERRAL from 1969 to 2021





                          Reason                    intra-articular steroid inje

ctions for back pain

 

                          Diagnosis 1               Back pain of lumbar region w

ith sciatica (M54.40)

 

                          Referral Organization     [facility] Resident

 

                          Referring Provider First Name Jack

 

                          Referring Provider Last Name Emmy

 

                          Referring Provider Specialty Family Medicine

 

                          Referred Organization     [facility]

 

                          Referred Provider         Uday Mckee

 

                          Referred Address          [address] [facility] [address] [facility] [address] 

[facility],[phone],[place], [place],[address]

 

                          Referred Provider Specialty Pain Medicine

 

                          Referral Priority         Routine

 

                          Referral Appointment Date 2021-10-06

 

                          General Notes             Frida Mclean  2021 9:

53:11 AM > Faxed and sent 

[email or web address] Mendoza  8/3/2021 1:18:02 PM > pt is aware







VITAL SIGNS





                    Weight              145.6 lbs           

 

                    Height              71 in               

 

                    BMI                 20.30 kg/m2         

 

                    Heart Rate          98 /min             

 

                    Respiratory Rate    18 /min             

 

                    Temperature         97.1 degrees Fahrenheit 

 

                    Oximetry            97                  

 

                    Blood pressure systolic 110 mm Hg           

 

                    Blood pressure diastolic 70 mm Hg            







MEDICATIONS





           Medication SIG (Take, Route, Frequency, Duration) Notes      Start Da

te End Date   

Status

 

           DULoxetine HCl 30 MG 1 capsule Orally Once a day for 30 day(s)       

                 

Active

 

           Gabapentin 300 MG 1 capsule Orally TID for 30 days                   

               Active

 

           Methocarbamol 500 MG 1 tablet Orally every 8 hrs for 30 day(s)       

                 

Active

 

                          Ventolin  (90 Base) MCG/ACT 2 puffs as needed I

nhalation every 4 hrs for 

30 days                                                         Active

 

             Plavix 75 MG 1 tablet Orally Once a day for 90 day(s) Not taking pe

r surgeon               

                                        Not-Taking







PROCEDURES

No Information



RESULTS

No Results



REASON FOR VISIT

3w follow ok per lisa 



MEDICAL (GENERAL) HISTORY





                    Type                Description         Date

 

                    Medical History     Right fibula fracture, Left leg fracture

  

 

                    Medical History     Depression and Anxiety secondary to loss

 of son in   

 

                    Medical History     ADHD                 

 

                    Medical History     ASCVD 10 YR RISK: 2.1%  

 

                          Medical History           Cigarette nicotine dependenc

e with other nicotine-induced 

disorder                                 

 

                    Medical History     Ischemia of digits of hand  

 

                    Medical History     Smoking              

 

                    Medical History     Thromboangitis Obliterans  

 

                    Surgical History    Pins and rods placed in left leg 

 

                    Surgical History    Right hand surgery  

 

                    Surgical History    Vasectomy           

 

                    Surgical History    diverticulitis      2021







Goals Section

No Information



Health Concerns

No Information



MEDICAL EQUIPMENT

No Information



MENTAL STATUS

No Information



FUNCTIONAL STATUS

No Information



ASSESSMENTS





             Encounter Date Diagnosis    Assessment Notes Treatment Notes Treatm

ent Clinical 

Notes

 

                    Back pain of lumbar region with sciatica (ICD-10

 - M54.40)                 



                                        



Will refer to Dr. Mckee at the pain clinic for consideration of steroid 
injections, he has been seen by PT in the past and feels like it didn't help at 
all, he has never been evaluated by an orthopedic surgeon or had a lumbar MRI, 
may consider that in the future, lumbar XR showing diffuse DDD. Will prescibe 
muscle relaxer in the interim and increase his gabapentin dose, see neuropathic 
pain section. Patient given instructions regarding cautious and appropriate use 
of these medications and not to operate heavy machinery while taking them. 
Patient verbalized understanding and agreement with plan moving forward.

 

                                        COPD (chronic obstructive pu

lmonary disease) with chronic 

bronchitis (ICD-10 - J44.9)                           



                                        



Takes 2 puffs in the morning, I'm fine refilling this, but we may consider low 
dose ICS in the future and using his ventolin as a rescue inhaler rather than a 
maintenance therapy. Patient in agreement with plan moving forward.

 

                                        Current moderate episode of 

major depressive disorder without prior

episode (ICD-10 - F32.1)                            



                                        



Will stop his lexapro and start him on cymbalta given his chronic pain, given 
anticipatory guidance regarding side effects and to seek ED attention in the 
event he experienced SI or HI. Was further advised this medication can take up 
to 4-6 weeks to take full effect. Patient verbalized understanding and agreement
with plan moving forward.

 

                    Neuropathic pain, leg, bilateral (ICD-10 - G57.9

3)                 



                                        



He has been taking 2 in the morning and 2 in the evening. Advised him to take 
meds as prescribed, increasing gabapentin to tid dosing, his symptoms do not 
seem especially neuropathic in origin, but they have been in the past per his 
report. However on physical exam he does not complain of neuropathic symptoms, 
they seem localized to his low back. Patient in agreement with plan moving 
forward.







PLAN OF TREATMENT

Medication



                Medication Name Sig             Start Date      Stop Date

 

                DULoxetine HCl 30 MG 1 capsule Orally Once a day for 30 day(s) 1

2 2021     

 

                          Ventolin  (90 Base) MCG/ACT 2 puffs as needed I

nhalation every 4 hrs for 

30 days                                              

 

                Gabapentin 300 MG 1 capsule Orally TID for 30 days              

    

 

                Methocarbamol 500 MG 1 tablet Orally every 8 hrs for 30 day(s) 1

     



Treatment Notes



                    Assessment          Notes               Clinical Notes

 

                    Back pain of lumbar region with sciatica                    

 Will refer to Dr. Mckee at the 

pain clinic for consideration of steroid injections, he has been seen by PT in 
the past and feels like it didn't help at all, he has never been evaluated by an
orthopedic surgeon or had a lumbar MRI, may consider that in the future, lumbar 
XR showing diffuse DDD. Will prescibe muscle relaxer in the interim and increase
his gabapentin dose, see neuropathic pain section. Patient given instructions 
regarding cautious and appropriate use of these medications and not to operate 
heavy machinery while taking them. Patient verbalized understanding and 
agreement with plan moving forward.

 

                    COPD (chronic obstructive pulmonary disease) with chronic br

onchitis                     Takes 2 

puffs in the morning, I'm fine refilling this, but we may consider low dose ICS 
in the future and using his ventolin as a rescue inhaler rather than a 
maintenance therapy. Patient in agreement with plan moving forward.

 

                    Current moderate episode of major depressive disorder withou

t prior episode                     

Will stop his lexapro and start him on cymbalta given his chronic pain, given 
anticipatory guidance regarding side effects and to seek ED attention in the 
event he experienced SI or HI. Was further advised this medication can take up 
to 4-6 weeks to take full effect. Patient verbalized understanding and agreement
with plan moving forward.

 

                    Neuropathic pain, leg, bilateral                     He has 

been taking 2 in the morning and 2 in 

the evening. Advised him to take meds as prescribed, increasing gabapentin to 
tid dosing, his symptoms do not seem especially neuropathic in origin, but they 
have been in the past per his report. However on physical exam he does not 
complain of neuropathic symptoms, they seem localized to his low back. Patient 
in agreement with plan moving forward.



Referrals



                          Referral Date             Details

 

                          2021-10-06                2021-10-06, intra-articular 

steroid injections for back pain, Uday Mckee, [address], [place], [address], [phone]



Next Appt



                                        Details

 

                                        6 Weeks Reason:f/u back pain

 

                                        Provider Name:Uday Mckee, 2021-10-06 

08:30:00 AM, [address], [phone], [place], [address], [phone]



Follow Up:6 Weeksf/u back pain



Insurance Providers





             Payer Name   Payer Address Payer Phone  Insured Name Patient Relati

onship to 

Insured                   Coverage Start Date       Coverage End Date

 

                [facility] [employer] [facility] [address]  [facility] [phone]

[phone]    MIRIAM SUMMERS  [id number]